# Patient Record
Sex: FEMALE | Race: WHITE | Employment: OTHER | URBAN - METROPOLITAN AREA
[De-identification: names, ages, dates, MRNs, and addresses within clinical notes are randomized per-mention and may not be internally consistent; named-entity substitution may affect disease eponyms.]

---

## 2017-04-12 ENCOUNTER — OFFICE VISIT (OUTPATIENT)
Dept: FAMILY MEDICINE CLINIC | Age: 67
End: 2017-04-12

## 2017-04-12 VITALS
TEMPERATURE: 98.4 F | BODY MASS INDEX: 22.79 KG/M2 | RESPIRATION RATE: 15 BRPM | HEIGHT: 66 IN | SYSTOLIC BLOOD PRESSURE: 127 MMHG | DIASTOLIC BLOOD PRESSURE: 72 MMHG | HEART RATE: 71 BPM | OXYGEN SATURATION: 98 % | WEIGHT: 141.8 LBS

## 2017-04-12 DIAGNOSIS — E78.5 HYPERLIPIDEMIA, UNSPECIFIED HYPERLIPIDEMIA TYPE: Primary | ICD-10-CM

## 2017-04-12 DIAGNOSIS — Z23 ENCOUNTER FOR IMMUNIZATION: ICD-10-CM

## 2017-04-12 DIAGNOSIS — R53.81 MALAISE AND FATIGUE: ICD-10-CM

## 2017-04-12 DIAGNOSIS — Z71.89 ACP (ADVANCE CARE PLANNING): ICD-10-CM

## 2017-04-12 DIAGNOSIS — Z78.0 POSTMENOPAUSAL: ICD-10-CM

## 2017-04-12 DIAGNOSIS — Z00.00 ENCOUNTER FOR MEDICARE ANNUAL WELLNESS EXAM: ICD-10-CM

## 2017-04-12 DIAGNOSIS — R53.83 MALAISE AND FATIGUE: ICD-10-CM

## 2017-04-12 DIAGNOSIS — Z11.59 SCREENING FOR VIRAL DISEASE: ICD-10-CM

## 2017-04-12 DIAGNOSIS — Z13.820 OSTEOPOROSIS SCREENING: ICD-10-CM

## 2017-04-12 NOTE — PROGRESS NOTES
HISTORY OF PRESENT ILLNESS  Stacey Kaplan is a 78 y.o. female. Blood pressure 127/72, pulse 71, temperature 98.4 °F (36.9 °C), temperature source Oral, resp. rate 15, height 5' 6\" (1.676 m), weight 141 lb 12.8 oz (64.3 kg), SpO2 98 %. Body mass index is 22.89 kg/(m^2). Chief Complaint   Patient presents with    Complete Physical      HPI   Stacey Kaplan 78 y.o. female  presents to the office today for a complete physical. Bp at office today 127/72. Hyperlipidemia: NMR Lipoprofile on 09/16/15 notable for total cholesterol 317, LDL-P 1866, LDL-C 218, HDL 85, and triglycerides 72. Pt is not on any statins. LDL is elevated at 218 per labs on 09/16/15. Will reassess levels when pt returns for fasting labs this week. Allergic rhinitis/deviated septum: Pt notes history of deviated septum and allergic rhinitis year round. Deviated septum occurred after injury to her nose many years ago and she did complete corrective surgery She is taking Claritin daily with only mild relief in symptoms. Pt does admit to using nasal sprays in the past, but states she has not regularly taken them. I have advised pt to take Flonase regularly during the duration of her allergy season. Discussed with pt she should stop using the spray if she notes any associated nasal bleeding. Health maintenance: Pt is due for Prevnar-13 vaccine. Prescription sent to pharmacy today. Pt notes last eye exam was one year ago. She is due for DEXA scan, but notes she completes it through her gynecologist (Dr. Alex Avalos). Advised pt to complete DEXA scan this year since she is long due to rule out osteopenia/osteoporosis. Orders placed today. I have asked pt a series of questions related to [employer]. Patient given [facility] Directive form and booklet. Patient advised to follow up with me or contact nurse navigator to submit these form to medical chart.  I advised Patient of the benefits of Advance Care Plan with regard to end of life care and benefits of filling forms out in case Patient cannot speak for themselves. Current Outpatient Prescriptions   Medication Sig Dispense Refill    ASCORBATE CALCIUM (VITAMIN C PO) Take 1 Tab by mouth daily.  CALCIUM PO Take 600 mg by mouth daily.  loratadine (CLARITIN) 10 mg tablet Take 10 mg by mouth daily as needed.  mometasone (NASONEX) 50 mcg/actuation nasal spray 2 Sprays by Both Nostrils route daily. 1 Container 5    azelastine (ASTELIN) 137 mcg nasal spray 2 Sprays by Both Nostrils route two (2) times a day. Use in each nostril as directed 1 Bottle 5     No Known Allergies  Past Medical History:   Diagnosis Date    Hyperlipidemia 7/9/2012     Past Surgical History:   Procedure Laterality Date    ENDOSCOPY, COLON, DIAGNOSTIC  2007    polyp Follow up 9/2012; Dr. Juan Bonilla    HX GYN      HX HEENT  9/10    cholesteatoma removal and ruptured ear drum     Family History   Problem Relation Age of Onset    Cancer Maternal Aunt      breast     Social History   Substance Use Topics    Smoking status: Former Smoker     Quit date: 1/1/1990    Smokeless tobacco: Never Used    Alcohol use Yes      Comment: ocassionally/ once per week        Review of Systems   Constitutional: Positive for malaise/fatigue. Negative for chills, diaphoresis, fever and weight loss. HENT: Positive for congestion. Negative for ear discharge, ear pain, hearing loss, nosebleeds, sore throat and tinnitus. Eyes: Negative for blurred vision, double vision, photophobia, pain, discharge and redness. Respiratory: Negative for cough, hemoptysis, sputum production, shortness of breath, wheezing and stridor. Cardiovascular: Negative for chest pain, palpitations, orthopnea, claudication, leg swelling and PND. Gastrointestinal: Negative for abdominal pain, blood in stool, constipation, diarrhea, heartburn, melena, nausea and vomiting.    Genitourinary: Negative for dysuria, flank pain, frequency, hematuria and urgency. Musculoskeletal: Negative. Negative for back pain, falls, joint pain, myalgias and neck pain. Skin: Negative for itching and rash. Neurological: Negative. Negative for dizziness, tingling, tremors, sensory change, speech change, focal weakness, seizures, loss of consciousness, weakness and headaches. Endo/Heme/Allergies: Positive for environmental allergies. Negative for polydipsia. Does not bruise/bleed easily. Psychiatric/Behavioral: Negative for depression, hallucinations, memory loss, substance abuse and suicidal ideas. The patient is not nervous/anxious and does not have insomnia. All other systems reviewed and are negative. Physical Exam   Constitutional: She is oriented to person, place, and time. She appears well-developed and well-nourished. No distress. HENT:   Head: Normocephalic and atraumatic. Right Ear: External ear normal.   Left Ear: External ear normal.   Nose: Nose normal.   Mouth/Throat: Oropharynx is clear and moist. No oropharyngeal exudate. Hypertrophic, boggy enlarged turbinates    Eyes: Conjunctivae and EOM are normal. Pupils are equal, round, and reactive to light. Right eye exhibits no discharge. Left eye exhibits no discharge. No scleral icterus. Neck: Normal range of motion. Neck supple. No JVD present. Carotid bruit is not present. No tracheal deviation present. No thyromegaly present. Cardiovascular: Normal rate, regular rhythm, normal heart sounds and intact distal pulses. Exam reveals no gallop and no friction rub. No murmur heard. Pulmonary/Chest: Effort normal and breath sounds normal. No stridor. No respiratory distress. She has no wheezes. She has no rales. She exhibits no tenderness. Abdominal: Soft. Bowel sounds are normal. She exhibits no distension and no mass. There is no tenderness. There is no rebound and no guarding. Musculoskeletal: Normal range of motion. She exhibits no edema or tenderness.    Lymphadenopathy: She has no cervical adenopathy. Neurological: She is alert and oriented to person, place, and time. She has normal reflexes. No cranial nerve deficit. She exhibits normal muscle tone. Coordination normal.   Skin: Skin is warm and dry. No rash noted. She is not diaphoretic. No erythema. No pallor. Psychiatric: She has a normal mood and affect. Her behavior is normal. Judgment and thought content normal.   Nursing note and vitals reviewed. ASSESSMENT and Tamanna Gear was seen today for complete physical.    Diagnoses and all orders for this visit:    Hyperlipidemia, unspecified hyperlipidemia type  -     METABOLIC PANEL, COMPREHENSIVE  -     LIPID PANEL  - LDL elevated at 218 per labs on 09/16/15. Pt is not on any statins. Will reassess levels when pt returns for fasting labs. Encounter for Medicare annual wellness exam    ACP (advance care planning)  Patient given [facility] Directive form and booklet. Patient advised to follow up with me or contact nurse navigator to submit these form to medical chart. I advised Patient of the benefits of Advance Care Plan with regard to end of life care and benefits of filling forms out in case Patient cannot speak for themselves. Malaise and fatigue  -     TSH 3RD GENERATION  -     THYROID ANTIBODY PANEL  -     T4, FREE  -     CBC WITH AUTOMATED DIFF  - Will check thyroid function to rule out any metabolic deficiency as cause for fatigue    Encounter for immunization  -     pneumococcal 13 janel conj dip (PREVNAR-13) 0.5 mL syrg injection; 0.5 mL by IntraMUSCular route once for 1 dose. Screening for viral disease  -     HEPATITIS C AB    Osteoporosis screening  -     DEXA BONE DENSITY STUDY AXIAL; Future    Postmenopausal  -     DEXA BONE DENSITY STUDY AXIAL;  Future      Follow-up Disposition:  Return in about 1 year (around 4/12/2018) for medicare wellness exam.     Medication risks/benefits/costs/interactions/alternatives discussed with patient. Advised patient to call back or return to office if symptoms worsen/change/persist.  If patient cannot reach us or should anything more severe/urgent arise he/she should proceed directly to the nearest emergency department. Discussed expected course/resolution/complications of diagnosis in detail with patient. Patient given a written after visit summary which includes her diagnoses, current medications and vitals. Patient expressed understanding with the diagnosis and plan. Written by dirk Ceron, as dictated by Alma Delia Ovalles M.D.    I have reviewed and agree with the above note and have made corrections where appropriate, Dr. Dannielle Nelson MD

## 2017-04-12 NOTE — MR AVS SNAPSHOT
Visit Information Date & Time Provider Department Dept. Phone Encounter #  
 4/12/2017  3:15 PM Alma Delia Ovalles MD [address] [place] [address] [phone] [id number] Follow-up Instructions Return in about 1 year (around 4/12/2018) for medicare wellness exam.  
  
Your Appointments 4/18/2018 11:00 AM  
Medicare Physical with Alma Delia Ovalles MD  
[facility]) Appt Note: medicare wellness [address] [phone]  
[phone]  
  
   
 [address] [phone] Upcoming Health Maintenance Date Due Hepatitis C Screening 1950 ZOSTER VACCINE AGE 60> 11/4/2009 GLAUCOMA SCREENING Q2Y 11/4/2014 OSTEOPOROSIS SCREENING (DEXA) 11/4/2014 Pneumococcal 65+ Low/Medium Risk (2 of 2 - PCV13) 9/16/2016 MEDICARE YEARLY EXAM 9/16/2016 BREAST CANCER SCRN MAMMOGRAM 9/23/2017 DTaP/Tdap/Td series (2 - Td) 2/18/2021 COLONOSCOPY 10/29/2025 Allergies as of 4/12/2017  Review Complete On: 4/12/2017 By: Alma Delia Ovalles MD  
 No Known Allergies Current Immunizations  Never Reviewed Name Date Pneumococcal Polysaccharide (PPSV-23) 9/16/2015 TDAP Vaccine 2/18/2011 Not reviewed this visit You Were Diagnosed With   
  
 Codes Comments Hyperlipidemia, unspecified hyperlipidemia type    -  Primary ICD-10-CM: E78.5 ICD-9-CM: 272.4 Encounter for Medicare annual wellness exam     ICD-10-CM: Z00.00 ICD-9-CM: V70.0 ACP (advance care planning)     ICD-10-CM: Z71.89 ICD-9-CM: V65.49 Malaise and fatigue     ICD-10-CM: R53.81, R53.83 ICD-9-CM: 780.79 Encounter for immunization     ICD-10-CM: I04 ICD-9-CM: V03.89 Screening for viral disease     ICD-10-CM: Z11.59 
ICD-9-CM: V73.99 Osteoporosis screening     ICD-10-CM: Z13.820 ICD-9-CM: V82.81 Postmenopausal     ICD-10-CM: Z78.0 ICD-9-CM: V49.81 Vitals BP Pulse Temp Resp Height(growth percentile) Weight(growth percentile) 127/72 (BP 1 Location: Left arm, BP Patient Position: Sitting) 71 98.4 °F (36.9 °C) (Oral) 15 5' 6\" (1.676 m) 141 lb 12.8 oz (64.3 kg) SpO2 BMI OB Status Smoking Status 98% 22.89 kg/m2 Postmenopausal Former Smoker Vitals History BMI and BSA Data Body Mass Index Body Surface Area  
 22.89 kg/m 2 1.73 m 2 Preferred Pharmacy Pharmacy Name Phone [id number]- [place], [address] [place] [address] [phone] Your Updated Medication List  
  
   
This list is accurate as of: 17  4:33 PM.  Always use your most recent med list.  
  
  
  
  
 azelastine 137 mcg (0.1 %) nasal spray Commonly known as:  ASTELIN  
2 Sprays by Both Nostrils route two (2) times a day. Use in each nostril as directed CALCIUM PO Take 600 mg by mouth daily. CLARITIN 10 mg tablet Generic drug:  loratadine Take 10 mg by mouth daily as needed. mometasone 50 mcg/actuation nasal spray Commonly known as:  NASONEX  
2 Sprays by Both Nostrils route daily. pneumococcal 13 janel conj dip 0.5 mL Syrg injection Commonly known as:  PREVNAR-13  
0.5 mL by IntraMUSCular route once for 1 dose. VITAMIN C PO Take 1 Tab by mouth daily. Prescriptions Sent to Pharmacy Refills  
 pneumococcal 13 janel conj dip (PREVNAR-13) 0.5 mL syrg injection 0 Si.5 mL by IntraMUSCular route once for 1 dose. Class: Normal  
 Pharmacy: [id number] [place], [address] [place] [address] Ph #: [phone] Route: IntraMUSCular We Performed the Following CBC WITH AUTOMATED DIFF [99975 CPT(R)] HEPATITIS C AB [60379 CPT(R)] LIPID PANEL [30501 CPT(R)] METABOLIC PANEL, COMPREHENSIVE [77613 CPT(R)] T4, FREE [id number] CPT(R)] THYROID ANTIBODY PANEL [79416 CPT(R)] TSH 3RD GENERATION [07691 CPT(R)] Follow-up Instructions Return in about 1 year (around 4/12/2018) for medicare wellness exam. To-Do List   
 04/12/2017 Imaging:  DEXA BONE DENSITY STUDY AXIAL Introducing \[facility] [place] [facility]\"" & HEALTH SERVICES! Dear Tim Cabrera: Thank you for requesting a [employer] account. Our records indicate that you already have an active [employer] account. You can access your account anytime at [email or web address] Did you know that you can access your hospital and ER discharge instructions at any time in [employer]? You can also review all of your test results from your hospital stay or ER visit. Additional Information If you have questions, please visit the Frequently Asked Questions section of the [employer] website at [email or web address] Remember, [employer] is NOT to be used for urgent needs. For medical emergencies, dial 911. Now available from your iPhone and Android! Please provide this summary of care documentation to your next provider. Your primary care clinician is listed as Lon Hurtado. If you have any questions after today's visit, please call [phone].

## 2017-04-12 NOTE — PROGRESS NOTES
Chief Complaint   Patient presents with    Complete Physical       1. Have you been to the ER, urgent care clinic since your last visit? Hospitalized since your last visit? No    2. Have you seen or consulted any other health care providers outside of the [address] [place] [address] since your last visit? Include any pap smears or colon screening. No    Body mass index is 22.89 kg/(m^2).

## 2017-04-13 ENCOUNTER — HOSPITAL ENCOUNTER (OUTPATIENT)
Dept: LAB | Age: 67
Discharge: HOME | End: 2017-04-13
Payer: MEDICARE

## 2017-04-13 PROCEDURE — 80053 COMPREHEN METABOLIC PANEL: CPT

## 2017-04-13 PROCEDURE — 86800 THYROGLOBULIN ANTIBODY: CPT

## 2017-04-13 PROCEDURE — 84439 ASSAY OF FREE THYROXINE: CPT

## 2017-04-13 PROCEDURE — 85025 COMPLETE CBC W/AUTO DIFF WBC: CPT

## 2017-04-13 PROCEDURE — 84443 ASSAY THYROID STIM HORMONE: CPT

## 2017-04-13 PROCEDURE — 80061 LIPID PANEL: CPT

## 2017-04-13 PROCEDURE — 86803 HEPATITIS C AB TEST: CPT

## 2017-04-14 LAB
ALBUMIN SERPL-MCNC: 4.3 G/DL (ref 3.6–4.8)
ALBUMIN/GLOB SERPL: 2 {RATIO} (ref 1.2–2.2)
ALP SERPL-CCNC: 73 IU/L (ref 39–117)
ALT SERPL-CCNC: 11 IU/L (ref 0–32)
AST SERPL-CCNC: 15 IU/L (ref 0–40)
BASOPHILS # BLD AUTO: 0 X10E3/UL (ref 0–0.2)
BASOPHILS NFR BLD AUTO: 0 %
BILIRUB SERPL-MCNC: 0.5 MG/DL (ref 0–1.2)
BUN SERPL-MCNC: 16 MG/DL (ref 8–27)
BUN/CREAT SERPL: 19 (ref 12–28)
CALCIUM SERPL-MCNC: 8.9 MG/DL (ref 8.7–10.3)
CHLORIDE SERPL-SCNC: 101 MMOL/L (ref 96–106)
CHOLEST SERPL-MCNC: 292 MG/DL (ref 100–199)
CO2 SERPL-SCNC: 24 MMOL/L (ref 18–29)
CREAT SERPL-MCNC: 0.83 MG/DL (ref 0.57–1)
EOSINOPHIL # BLD AUTO: 0 X10E3/UL (ref 0–0.4)
EOSINOPHIL NFR BLD AUTO: 1 %
ERYTHROCYTE [DISTWIDTH] IN BLOOD BY AUTOMATED COUNT: 12.7 % (ref 12.3–15.4)
GLOBULIN SER CALC-MCNC: 2.2 G/DL (ref 1.5–4.5)
GLUCOSE SERPL-MCNC: 90 MG/DL (ref 65–99)
HCT VFR BLD AUTO: 42.4 % (ref 34–46.6)
HCV AB S/CO SERPL IA: <0.1 S/CO RATIO (ref 0–0.9)
HDLC SERPL-MCNC: 88 MG/DL
HGB BLD-MCNC: 14 G/DL (ref 11.1–15.9)
IMM GRANULOCYTES # BLD: 0 X10E3/UL (ref 0–0.1)
IMM GRANULOCYTES NFR BLD: 0 %
INTERPRETATION, 910389: NORMAL
LDLC SERPL CALC-MCNC: 187 MG/DL (ref 0–99)
LYMPHOCYTES # BLD AUTO: 1.3 X10E3/UL (ref 0.7–3.1)
LYMPHOCYTES NFR BLD AUTO: 28 %
MCH RBC QN AUTO: 30.6 PG (ref 26.6–33)
MCHC RBC AUTO-ENTMCNC: 33 G/DL (ref 31.5–35.7)
MCV RBC AUTO: 93 FL (ref 79–97)
MONOCYTES # BLD AUTO: 0.4 X10E3/UL (ref 0.1–0.9)
MONOCYTES NFR BLD AUTO: 9 %
NEUTROPHILS # BLD AUTO: 2.8 X10E3/UL (ref 1.4–7)
NEUTROPHILS NFR BLD AUTO: 62 %
PLATELET # BLD AUTO: 233 X10E3/UL (ref 150–379)
POTASSIUM SERPL-SCNC: 4.5 MMOL/L (ref 3.5–5.2)
PROT SERPL-MCNC: 6.5 G/DL (ref 6–8.5)
RBC # BLD AUTO: 4.58 X10E6/UL (ref 3.77–5.28)
SODIUM SERPL-SCNC: 140 MMOL/L (ref 134–144)
T4 FREE SERPL-MCNC: 1.05 NG/DL (ref 0.82–1.77)
THYROGLOB AB SERPL-ACNC: <1 IU/ML (ref 0–0.9)
THYROPEROXIDASE AB SERPL-ACNC: 18 IU/ML (ref 0–34)
TRIGL SERPL-MCNC: 84 MG/DL (ref 0–149)
TSH SERPL DL<=0.005 MIU/L-ACNC: 2.21 UIU/ML (ref 0.45–4.5)
VLDLC SERPL CALC-MCNC: 17 MG/DL (ref 5–40)
WBC # BLD AUTO: 4.5 X10E3/UL (ref 3.4–10.8)

## 2017-04-14 NOTE — PROGRESS NOTES
All her her labs are stable  Except for cholesterol  LDL is 187 which is very much elevated.     We need to discuss dietary modification and possible medication management    Set up follow up OV

## 2017-04-16 NOTE — ACP (ADVANCE CARE PLANNING)
Advance Care Planning (ACP) Provider Note - Comprehensive     Date of ACP Conversation: 04/16/17  Persons included in Conversation:  patient  Length of ACP Conversation in minutes:  <16 minutes (Non-Billable)              General ACP for ALL Patients with Decision Making Capacity:   Importance of advance care planning, including choosing a healthcare agent to communicate patient's healthcare decisions if patient lost the ability to make decisions, such as after a sudden illness or accident      For Serious or Chronic Illness:  No known illness    Interventions Provided:  Recommended completion of Advance Directive form after review of ACP materials and conversation with prospective healthcare agent

## 2017-04-16 NOTE — PROGRESS NOTES
Beverly Evangelista is a 78 y.o. female and presents for annual Medicare Wellness Visit. Problem List: Reviewed with patient and discussed risk factors. Patient Active Problem List   Diagnosis Code    Hyperlipidemia E78.5    ACP (advance care planning) Z71.89       Current medical providers:  Patient Care Team:  Myla Espinoza MD as PCP - General (Family Practice)    [address] [place] [address] Ishmael: Reviewed with patient  Past Surgical History:   Procedure Laterality Date    ENDOSCOPY, COLON, DIAGNOSTIC  2007    polyp Follow up 9/2012; Dr. Fernando Rivera HX HEENT  9/10    cholesteatoma removal and ruptured ear drum        SH: Reviewed with patient  Social History   Substance Use Topics    Smoking status: Former Smoker     Quit date: 1/1/1990    Smokeless tobacco: Never Used    Alcohol use Yes      Comment: ocassionally/ once per week       FH: Reviewed with patient  Family History   Problem Relation Age of Onset    Cancer Maternal Aunt      breast       Medications/Allergies: Reviewed with patient  Current Outpatient Prescriptions on File Prior to Visit   Medication Sig Dispense Refill    ASCORBATE CALCIUM (VITAMIN C PO) Take 1 Tab by mouth daily.  CALCIUM PO Take 600 mg by mouth daily.  loratadine (CLARITIN) 10 mg tablet Take 10 mg by mouth daily as needed. No current facility-administered medications on file prior to visit. No Known Allergies    Objective:  Visit Vitals    /72 (BP 1 Location: Left arm, BP Patient Position: Sitting)    Pulse 71    Temp 98.4 °F (36.9 °C) (Oral)    Resp 15    Ht 5' 6\" (1.676 m)    Wt 141 lb 12.8 oz (64.3 kg)    SpO2 98%    BMI 22.89 kg/m2    Body mass index is 22.89 kg/(m^2).     Assessment of cognitive impairment: Alert and oriented x 3    Depression Screen:   PHQ 2 / 9, over the last two weeks 9/16/2015   Little interest or pleasure in doing things Not at all   Feeling down, depressed or hopeless Not at all   Total Score PHQ 2 0       Fall Risk Assessment: Fall Risk Assessment, last 12 mths 9/16/2015   Able to walk? Yes   Fall in past 12 months? No       Functional Ability:   Does the patient exhibit a steady gait? yes   How long did it take the patient to get up and walk from a sitting position? 1 second   Is the patient self reliant?  (ie can do own laundry, meals, household chores)  yes     Does the patient handle his/her own medications? yes     Does the patient handle his/her own money? yes     Is the patients home safe (ie good lighting, handrails on stairs and bath, etc.)? yes     Did you notice or did patient express any hearing difficulties? no     Did you notice or did patient express any vision difficulties?   no     Were distance and reading eye charts used? no       Advance Care Planning:   Patient was offered the opportunity to discuss advance care planning:  yes     Does patient have an Advance Directive:  no   If no, did you provide information on Caring Connections? yes       Plan:      Orders Placed This Encounter    DEXA BONE DENSITY STUDY AXIAL    TSH 3RD GENERATION    THYROID ANTIBODY PANEL    T4, FREE    CBC WITH AUTOMATED DIFF    METABOLIC PANEL, COMPREHENSIVE    LIPID PANEL    HEPATITIS C AB    CVD REPORT    pneumococcal 13 janel conj dip (PREVNAR-13) 0.5 mL syrg injection       Health Maintenance   Topic Date Due    ZOSTER VACCINE AGE 60>  11/04/2009    GLAUCOMA SCREENING Q2Y  11/04/2014    OSTEOPOROSIS SCREENING (DEXA)  11/04/2014    Pneumococcal 65+ Low/Medium Risk (2 of 2 - PCV13) 09/16/2016    BREAST CANCER SCRN MAMMOGRAM  09/23/2017    MEDICARE YEARLY EXAM  04/13/2018    DTaP/Tdap/Td series (2 - Td) 02/18/2021    COLONOSCOPY  10/29/2025    Hepatitis C Screening  Completed    INFLUENZA AGE 9 TO ADULT  Addressed       *Patient verbalized understanding and agreement with the plan. A copy of the After Visit Summary with personalized health plan was given to the patient today.

## 2017-04-17 ENCOUNTER — TELEPHONE (OUTPATIENT)
Dept: FAMILY MEDICINE CLINIC | Age: 67
End: 2017-04-17

## 2017-04-17 NOTE — TELEPHONE ENCOUNTER
----- Message from Glory Taylor MD sent at 4/14/2017 11:20 AM EDT -----  All her her labs are stable  Except for cholesterol  LDL is 187 which is very much elevated.     We need to discuss dietary modification and possible medication management    Set up follow up OV

## 2017-04-17 NOTE — PROGRESS NOTES
[phone] verified  Patient notified of above note and voiced understanding of what was read.     Per patient refused medication management and her diet is better than Dr Custer Koyanagi just want to do her diet and exercise

## 2017-04-17 NOTE — PROGRESS NOTES
[phone] (home) attempted to call patient no answer left message to call me back in regards to her labs

## 2017-04-17 NOTE — TELEPHONE ENCOUNTER
[phone] verified  Patient notified of above note and voiced understanding of what was read.     Per patient refused medication management and her diet is better than Dr Kylah Ng just want to do her diet and exercise

## 2017-10-27 ENCOUNTER — TELEPHONE (OUTPATIENT)
Dept: FAMILY MEDICINE CLINIC | Age: 67
End: 2017-10-27

## 2017-10-27 NOTE — TELEPHONE ENCOUNTER
Dr Gayle Ladd sent prevnar 13 4/12/2017     [phone] [facility] called in prescription to pharmacy via  for patients Prevnar 13    [phone] notified patient prescription was called in to pharmacy

## 2017-10-27 NOTE — TELEPHONE ENCOUNTER
Patient is requesting that her second pneumonia vaccine be sent to the pharmacy on file. She is requesting that the nurse return her call when the vaccine is called in.     Best call back # for patient:[phone]  Pharmacy on file verified

## 2018-04-25 ENCOUNTER — OFFICE VISIT (OUTPATIENT)
Dept: FAMILY MEDICINE CLINIC | Age: 68
End: 2018-04-25

## 2018-04-25 VITALS
HEART RATE: 70 BPM | BODY MASS INDEX: 23.3 KG/M2 | SYSTOLIC BLOOD PRESSURE: 132 MMHG | HEIGHT: 66 IN | TEMPERATURE: 98 F | DIASTOLIC BLOOD PRESSURE: 80 MMHG | RESPIRATION RATE: 17 BRPM | WEIGHT: 145 LBS | OXYGEN SATURATION: 97 %

## 2018-04-25 DIAGNOSIS — J40 BRONCHITIS: Primary | ICD-10-CM

## 2018-04-25 RX ORDER — AZITHROMYCIN 250 MG/1
TABLET, FILM COATED ORAL
Qty: 6 TAB | Refills: 0 | Status: SHIPPED | OUTPATIENT
Start: 2018-04-25 | End: 2018-04-30

## 2018-04-25 NOTE — MR AVS SNAPSHOT
Hood Mccarthy 
 
 
 [address] [facility] 
[phone] Patient: Bry Wilkinson MRN: [id number] Visit Information Date & Time Provider Department Dept. Phone Encounter #  
 4/25/2018  9:45 AM Darryle Pulley, NP [facility] [phone] [id number] Upcoming Health Maintenance Date Due ZOSTER VACCINE AGE 60> 9/4/2009 GLAUCOMA SCREENING Q2Y 11/4/2014 Bone Densitometry (Dexa) Screening 11/4/2014 Pneumococcal 65+ Low/Medium Risk (2 of 2 - PCV13) 9/16/2016 BREAST CANCER SCRN MAMMOGRAM 9/23/2017 MEDICARE YEARLY EXAM 4/13/2018 Influenza Age 5 to Adult 9/28/2018* DTaP/Tdap/Td series (2 - Td) 2/18/2021 COLONOSCOPY 10/29/2025 *Topic was postponed. The date shown is not the original due date. Allergies as of 4/25/2018  Review Complete On: 4/25/2018 By: Marvin Mccarty LPN No Known Allergies Current Immunizations  Never Reviewed Name Date Pneumococcal Polysaccharide (PPSV-23) 9/16/2015 TDAP Vaccine 2/18/2011 Not reviewed this visit You Were Diagnosed With   
  
 Codes Comments Bronchitis    -  Primary ICD-10-CM: M25 ICD-9-CM: 851 Vitals BP Pulse Temp Resp Height(growth percentile) Weight(growth percentile) 132/80 (BP 1 Location: Right arm, BP Patient Position: Sitting) 70 98 °F (36.7 °C) (Oral) 17 5' 6\" (1.676 m) 145 lb (65.8 kg) SpO2 BMI OB Status Smoking Status 97% 23.4 kg/m2 Postmenopausal Former Smoker Vitals History BMI and BSA Data Body Mass Index Body Surface Area  
 23.4 kg/m 2 1.75 m 2 Preferred Pharmacy Pharmacy Name Phone [id number] [address] [phone] Your Updated Medication List  
  
   
This list is accurate as of 4/25/18 10:20 AM.  Always use your most recent med list.  
  
  
  
  
 azithromycin 250 mg tablet Commonly known as:  Lindsey Barba Take 2 tablets today, then take 1 tablet daily CALCIUM PO Take 600 mg by mouth daily. CLARITIN 10 mg tablet Generic drug:  loratadine Take 10 mg by mouth daily as needed. VITAMIN C PO Take 1 Tab by mouth daily. Prescriptions Printed Refills  
 azithromycin (ZITHROMAX) 250 mg tablet 0 Sig: Take 2 tablets today, then take 1 tablet daily Class: Print To-Do List   
 04/25/2018 Imaging:  XR CHEST PA LAT Patient Instructions Bronchitis: Care Instructions Your Care Instructions Bronchitis is inflammation of the bronchial tubes, which carry air to the lungs. The tubes swell and produce mucus, or phlegm. The mucus and inflamed bronchial tubes make you cough. You may have trouble breathing. Most cases of bronchitis are caused by viruses like those that cause colds. Antibiotics usually do not help and they may be harmful. Bronchitis usually develops rapidly and lasts about 2 to 3 weeks in otherwise healthy people. Follow-up care is a key part of your treatment and safety. Be sure to make and go to all appointments, and call your doctor if you are having problems. It's also a good idea to know your test results and keep a list of the medicines you take. How can you care for yourself at home? · Take all medicines exactly as prescribed. Call your doctor if you think you are having a problem with your medicine. · Get some extra rest. 
· Take an over-the-counter pain medicine, such as acetaminophen (Tylenol), ibuprofen (Advil, Motrin), or naproxen (Aleve) to reduce fever and relieve body aches. Read and follow all instructions on the label. · Do not take two or more pain medicines at the same time unless the doctor told you to. Many pain medicines have acetaminophen, which is Tylenol. Too much acetaminophen (Tylenol) can be harmful.  
· Take an over-the-counter cough medicine that contains dextromethorphan to help quiet a dry, hacking cough so that you can sleep. Avoid cough medicines that have more than one active ingredient. Read and follow all instructions on the label. · Breathe moist air from a humidifier, hot shower, or sink filled with hot water. The heat and moisture will thin mucus so you can cough it out. · Do not smoke. Smoking can make bronchitis worse. If you need help quitting, talk to your doctor about stop-smoking programs and medicines. These can increase your chances of quitting for good. When should you call for help? Call 911 anytime you think you may need emergency care. For example, call if: 
? · You have severe trouble breathing. ?Call your doctor now or seek immediate medical care if: 
? · You have new or worse trouble breathing. ? · You cough up dark brown or bloody mucus (sputum). ? · You have a new or higher fever. ? · You have a new rash. ? Watch closely for changes in your health, and be sure to contact your doctor if: 
? · You cough more deeply or more often, especially if you notice more mucus or a change in the color of your mucus. ? · You are not getting better as expected. Where can you learn more? Go to http://maninder-miko.info/. Enter H333 in the search box to learn more about \"Bronchitis: Care Instructions. \" Current as of: May 12, 2017 Content Version: 11.4 © 7294-6459 Aledade. Care instructions adapted under license by [employer] (which disclaims liability or warranty for this information). If you have questions about a medical condition or this instruction, always ask your healthcare professional. [address] any warranty or liability for your use of this information. Introducing [facility] & HEALTH SERVICES! Dear Violeta Verma: Thank you for requesting a [employer] account. Our records indicate that you already have an active [employer] account.   You can access your account anytime at [email or web address] Did you know that you can access your hospital and ER discharge instructions at any time in Woto? You can also review all of your test results from your hospital stay or ER visit. Additional Information If you have questions, please visit the Frequently Asked Questions section of the Woto website at [email or web address] Remember, Woto is NOT to be used for urgent needs. For medical emergencies, dial 911. Now available from your iPhone and Android! Please provide this summary of care documentation to your next provider. Your primary care clinician is listed as Yas Garnica. If you have any questions after today's visit, please call [phone].

## 2018-04-25 NOTE — PROGRESS NOTES
Chief Complaint   Patient presents with    Chest Pain     upper chest discomfort- x 1 month     Cough     x1 month     1. Have you been to the ER, urgent care clinic since your last visit? Hospitalized since your last visit? [facility]- [facility]    2. Have you seen or consulted any other health care providers outside of the [address] [place] [address] since your last visit? Include any pap smears or colon screening.  No  Experiencing

## 2018-04-25 NOTE — PATIENT INSTRUCTIONS
Bronchitis: Care Instructions  Your Care Instructions    Bronchitis is inflammation of the bronchial tubes, which carry air to the lungs. The tubes swell and produce mucus, or phlegm. The mucus and inflamed bronchial tubes make you cough. You may have trouble breathing. Most cases of bronchitis are caused by viruses like those that cause colds. Antibiotics usually do not help and they may be harmful. Bronchitis usually develops rapidly and lasts about 2 to 3 weeks in otherwise healthy people. Follow-up care is a key part of your treatment and safety. Be sure to make and go to all appointments, and call your doctor if you are having problems. It's also a good idea to know your test results and keep a list of the medicines you take. How can you care for yourself at home? · Take all medicines exactly as prescribed. Call your doctor if you think you are having a problem with your medicine. · Get some extra rest.  · Take an over-the-counter pain medicine, such as acetaminophen (Tylenol), ibuprofen (Advil, Motrin), or naproxen (Aleve) to reduce fever and relieve body aches. Read and follow all instructions on the label. · Do not take two or more pain medicines at the same time unless the doctor told you to. Many pain medicines have acetaminophen, which is Tylenol. Too much acetaminophen (Tylenol) can be harmful. · Take an over-the-counter cough medicine that contains dextromethorphan to help quiet a dry, hacking cough so that you can sleep. Avoid cough medicines that have more than one active ingredient. Read and follow all instructions on the label. · Breathe moist air from a humidifier, hot shower, or sink filled with hot water. The heat and moisture will thin mucus so you can cough it out. · Do not smoke. Smoking can make bronchitis worse. If you need help quitting, talk to your doctor about stop-smoking programs and medicines. These can increase your chances of quitting for good.   When should you call for help? Call 911 anytime you think you may need emergency care. For example, call if:  ? · You have severe trouble breathing. ?Call your doctor now or seek immediate medical care if:  ? · You have new or worse trouble breathing. ? · You cough up dark brown or bloody mucus (sputum). ? · You have a new or higher fever. ? · You have a new rash. ? Watch closely for changes in your health, and be sure to contact your doctor if:  ? · You cough more deeply or more often, especially if you notice more mucus or a change in the color of your mucus. ? · You are not getting better as expected. Where can you learn more? Go to http://maninder-miko.info/. Enter H333 in the search box to learn more about \"Bronchitis: Care Instructions. \"  Current as of: May 12, 2017  Content Version: 11.4  © 7218-3912 [employer]ren[employer]. Care instructions adapted under license by [employer] (which disclaims liability or warranty for this information). If you have questions about a medical condition or this instruction, always ask your healthcare professional. [address] any warranty or liability for your use of this information.

## 2018-04-25 NOTE — PROGRESS NOTES
[facility] Jaciel Robison is a 75 y.o. female who was seen in clinic today (4/25/2018). Subjective:  Cough  Patient complains of nonproductive cough. Symptoms began 1 month ago. The cough is non-productive, without wheezing, dyspnea or hemoptysis and is aggravated by nothing. Associated symptoms include:postnasal drip. Patient reports having a viral infection at onset of symptoms. Denies fever, wheezing, shortness of breath or GERD. Prior to Admission medications    Medication Sig Start Date End Date Taking? Authorizing Provider   azithromycin (ZITHROMAX) 250 mg tablet Take 2 tablets today, then take 1 tablet daily 4/25/18 4/30/18 Yes Tremayne Olson NP   ASCORBATE CALCIUM (VITAMIN C PO) Take 1 Tab by mouth daily. Yes Historical Provider   CALCIUM PO Take 600 mg by mouth daily. Yes Historical Provider   loratadine (CLARITIN) 10 mg tablet Take 10 mg by mouth daily as needed. Yes Historical Provider          No Known Allergies        ROS  See HPI    Objective:   Physical Exam   Constitutional: She is oriented to person, place, and time. She appears well-developed and well-nourished. No distress. HENT:   Right Ear: Tympanic membrane and ear canal normal.   Left Ear: Tympanic membrane and ear canal normal.   Nose: Mucosal edema present. Right sinus exhibits no maxillary sinus tenderness and no frontal sinus tenderness. Left sinus exhibits no maxillary sinus tenderness and no frontal sinus tenderness. Mouth/Throat: Oropharynx is clear and moist.   Cardiovascular: Normal rate, regular rhythm and normal heart sounds. No murmur heard. Pulmonary/Chest: Effort normal and breath sounds normal. She has no decreased breath sounds. She has no wheezes. She has no rhonchi. Lymphadenopathy:     She has no cervical adenopathy. Neurological: She is alert and oriented to person, place, and time. Psychiatric: She has a normal mood and affect.  Her behavior is normal.   Nursing note and vitals reviewed. Visit Vitals    /80 (BP 1 Location: Right arm, BP Patient Position: Sitting)    Pulse 70    Temp 98 °F (36.7 °C) (Oral)    Resp 17    Ht 5' 6\" (1.676 m)    Wt 145 lb (65.8 kg)    SpO2 97%    BMI 23.4 kg/m2       Assessment & Plan:  Diagnoses and all orders for this visit:    1. Bronchitis  CXR today. Discussed that symptoms were viral and recommended treating symptoms. Increase fluids and rest. Reviewed OTC products that patient could take. -     XR CHEST PA LAT; Future  -     Delay fill azithromycin (ZITHROMAX) 250 mg tablet; Take 2 tablets today, then take 1 tablet daily      I have discussed the diagnosis with the patient and the intended plan as seen in the above orders. The patient has received an after-visit summary along with patient information handout. I have discussed medication side effects and warnings with the patient as well. Follow-up Disposition:  Return if symptoms worsen or fail to improve.         Nadeen Keyes, NP

## 2018-06-28 ENCOUNTER — OFFICE VISIT (OUTPATIENT)
Dept: FAMILY MEDICINE CLINIC | Age: 68
End: 2018-06-28

## 2018-06-28 VITALS
HEIGHT: 66 IN | SYSTOLIC BLOOD PRESSURE: 132 MMHG | RESPIRATION RATE: 18 BRPM | HEART RATE: 64 BPM | OXYGEN SATURATION: 98 % | BODY MASS INDEX: 22.82 KG/M2 | WEIGHT: 142 LBS | TEMPERATURE: 97.7 F | DIASTOLIC BLOOD PRESSURE: 82 MMHG

## 2018-06-28 DIAGNOSIS — E55.9 VITAMIN D DEFICIENCY: ICD-10-CM

## 2018-06-28 DIAGNOSIS — J45.30 MILD PERSISTENT REACTIVE AIRWAY DISEASE WITHOUT COMPLICATION: Primary | ICD-10-CM

## 2018-06-28 DIAGNOSIS — R53.82 CHRONIC FATIGUE: ICD-10-CM

## 2018-06-28 DIAGNOSIS — Z71.89 ACP (ADVANCE CARE PLANNING): ICD-10-CM

## 2018-06-28 DIAGNOSIS — Z00.00 MEDICARE ANNUAL WELLNESS VISIT, SUBSEQUENT: ICD-10-CM

## 2018-06-28 NOTE — PROGRESS NOTES
1. Have you been to the ER, urgent care clinic since your last visit? Hospitalized since your last visit? No    2. Have you seen or consulted any other health care providers outside of the [address] [place] [address] since your last visit? Include any pap smears or colon screening.  No

## 2018-06-28 NOTE — MR AVS SNAPSHOT
[facility] 
 
 
 [address] [facility] [address] [place] [address] 
[phone] Patient: Shanice Gamboa MRN: [id number] Visit Information Date & Time Provider Department Dept. Phone Encounter #  
 6/28/2018 10:30 AM Marta Matute  [facility] [phone] [id number] Upcoming Health Maintenance Date Due ZOSTER VACCINE AGE 60> 9/4/2009 GLAUCOMA SCREENING Q2Y 11/4/2014 Bone Densitometry (Dexa) Screening 11/4/2014 Pneumococcal 65+ Low/Medium Risk (2 of 2 - PCV13) 9/16/2016 BREAST CANCER SCRN MAMMOGRAM 9/23/2017 MEDICARE YEARLY EXAM 4/13/2018 Influenza Age 5 to Adult 9/28/2018* DTaP/Tdap/Td series (2 - Td) 2/18/2021 COLONOSCOPY 10/29/2025 *Topic was postponed. The date shown is not the original due date. Allergies as of 6/28/2018  Review Complete On: 6/28/2018 By: Vin Salinas LPN No Known Allergies Current Immunizations  Never Reviewed Name Date Pneumococcal Polysaccharide (PPSV-23) 9/16/2015 TDAP Vaccine 2/18/2011 Not reviewed this visit You Were Diagnosed With   
  
 Codes Comments Mild persistent reactive airway disease without complication    -  Primary ICD-10-CM: J45.30 ICD-9-CM: 493.90 Chronic fatigue     ICD-10-CM: R53.82 
ICD-9-CM: 780.79 Vitamin D deficiency     ICD-10-CM: E55.9 ICD-9-CM: 268.9 Vitals BP Pulse Temp Resp Height(growth percentile) Weight(growth percentile) 132/82 (BP 1 Location: Left arm, BP Patient Position: Sitting) 64 97.7 °F (36.5 °C) (Oral) 18 5' 6\" (1.676 m) 142 lb (64.4 kg) SpO2 BMI OB Status Smoking Status 98% 22.92 kg/m2 Postmenopausal Former Smoker Vitals History BMI and BSA Data Body Mass Index Body Surface Area  
 22.92 kg/m 2 1.73 m 2 Preferred Pharmacy Pharmacy Name Phone [id number] [place] [address] [facility] [phone] Your Updated Medication List  
  
   
This list is accurate as of 6/28/18 10:56 AM.  Always use your most recent med list.  
  
  
  
  
 CALCIUM PO Take 600 mg by mouth daily. CLARITIN 10 mg tablet Generic drug:  loratadine Take 10 mg by mouth daily as needed. VITAMIN C PO Take 1 Tab by mouth daily. We Performed the Following CBC WITH AUTOMATED DIFF [75480 CPT(R)] METABOLIC PANEL, COMPREHENSIVE [45496 CPT(R)] REFERRAL TO PULMONARY DISEASE [CAU05 Custom] TSH AND FREE T4 [04962 CPT(R)] VITAMIN D, 25 HYDROXY [id number] CPT(R)] Referral Information Referral ID Referred By Referred To  
  
 [id number] [facility] [address], [address] [facility] Visits Status Start Date End Date 1 New Request 6/28/18 6/28/19 If your referral has a status of pending review or denied, additional information will be sent to support the outcome of this decision. Patient Instructions Asthma in Adults: Care Instructions Your Care Instructions During an asthma attack, your airways swell and narrow as a reaction to certain things (triggers). This makes it hard to breathe. You may be able to prevent asthma attacks if you avoid the things that set off your asthma symptoms. Keeping your asthma under control and treating symptoms before they get bad can help you avoid severe attacks. If you can control your asthma, you may be able to do all of your normal daily activities. You may also avoid asthma attacks and trips to the hospital. 
Follow-up care is a key part of your treatment and safety. Be sure to make and go to all appointments, and call your doctor if you are having problems. It's also a good idea to know your test results and keep a list of the medicines you take. How can you care for yourself at home? · Follow your asthma action plan so you can manage your symptoms at home. An asthma action plan will help you prevent and control airway reactions and will tell you what to do during an asthma attack. If you do not have an asthma action plan, work with your doctor to build one. · Take your asthma medicine exactly as prescribed. Medicine plays an important role in controlling asthma. Talk to your doctor right away if you have any questions about what to take and how to take it. ¨ Use your quick-relief medicine when you have symptoms of an attack. Quick-relief medicine often is an albuterol inhaler. Some people need to use quick-relief medicine before they exercise. ¨ Take your controller medicine every day, not just when you have symptoms. Controller medicine is usually an inhaled corticosteroid. The goal is to prevent problems before they occur. Do not use your controller medicine to try to treat an attack that has already started. It does not work fast enough to help. ¨ If your doctor prescribed corticosteroid pills to use during an attack, take them as directed. They may take hours to work, but they may shorten the attack and help you breathe better. ¨ Keep your quick-relief medicine with you at all times. · Talk to your doctor before using other medicines. Some medicines, such as aspirin, can cause asthma attacks in some people. · Check yourself for asthma symptoms to know which step to follow in your action plan. Watch for things like being short of breath, having chest tightness, coughing, and wheezing. Also notice if symptoms wake you up at night or if you get tired quickly when you exercise. · If you have a peak flow meter, use it to check how well you are breathing. This can help you predict when an asthma attack is going to occur. Then you can take medicine to prevent the asthma attack or make it less severe. · See your doctor regularly. These visits will help you learn more about asthma and what you can do to control it.  Your doctor will monitor your treatment to make sure the medicine is helping you. · Keep track of your asthma attacks and your treatment. After you have had an attack, write down what triggered it, what helped end it, and any concerns you have about your asthma action plan. Take your diary when you see your doctor. You can then review your asthma action plan and decide if it is working. · Do not smoke or allow others to smoke around you. Avoid smoky places. Smoking makes asthma worse. If you need help quitting, talk to your doctor about stop-smoking programs and medicines. These can increase your chances of quitting for good. · Learn what triggers an asthma attack for you, and avoid the triggers when you can. Common triggers include colds, smoke, air pollution, dust, pollen, mold, pets, cockroaches, stress, and cold air. · Avoid colds and the flu. Get a pneumococcal vaccine shot. If you have had one before, ask your doctor whether you need a second dose. Get a flu vaccine every fall. If you must be around people with colds or the flu, wash your hands often. When should you call for help? Call 911 anytime you think you may need emergency care. For example, call if: 
? · You have severe trouble breathing. ?Call your doctor now or seek immediate medical care if: 
? · Your symptoms do not get better after you have followed your asthma action plan. ? · You cough up yellow, dark brown, or bloody mucus (sputum). ? Watch closely for changes in your health, and be sure to contact your doctor if: 
? · Your coughing and wheezing get worse. ? · You need to use quick-relief medicine on more than 2 days a week (unless it is just for exercise). ? · You need help figuring out what is triggering your asthma attacks. Where can you learn more? Go to http://maninder-miko.info/. Enter P597 in the search box to learn more about \"Asthma in Adults: Care Instructions. \" Current as of: May 12, 2017 Content Version: 11.4 © 0434-0991 Healthwise[employer] Incorporated. Care instructions adapted under license by [employer]-aki networks (which disclaims liability or warranty for this information). If you have questions about a medical condition or this instruction, always ask your healthcare professional. [address] any warranty or liability for your use of this information. Introducing \[facility]\"" & HEALTH SERVICES! Dear Jem Tavares: Thank you for requesting a [employer] account. Our records indicate that you already have an active [employer] account. You can access your account anytime at [email or web address] Did you know that you can access your hospital and ER discharge instructions at any time in [employer]? You can also review all of your test results from your hospital stay or ER visit. Additional Information If you have questions, please visit the Frequently Asked Questions section of the [employer] website at [email or web address] Remember, [employer] is NOT to be used for urgent needs. For medical emergencies, dial 911. Now available from your iPhone and Android! Please provide this summary of care documentation to your next provider. Your primary care clinician is listed as Paolo Jonas. If you have any questions after today's visit, please call [phone].

## 2018-06-28 NOTE — ACP (ADVANCE CARE PLANNING)
Advance Care Planning (ACP) Provider Conversation Snapshot    Date of ACP Conversation: 06/28/18  Persons included in Conversation:  patient  Length of ACP Conversation in minutes:  <16 minutes (Non-Billable)    Authorized Decision Maker (if patient is incapable of making informed decisions):    This person is:   Healthcare Agent/Medical Power of  under Advance Directive          For Patients with Decision Making Capacity:   Values/Goals: Exploration of values, goals, and preferences if recovery is not expected, even with continued medical treatment in the event of:  Imminent death    Conversation Outcomes / Follow-Up Plan:   Recommended communicating the plan and making copies for the healthcare agent, personal physician, and others as appropriate (e.g., health system)

## 2018-06-28 NOTE — PROGRESS NOTES
Brett Rice is a 75 y.o. female and presents for annual Medicare Wellness Visit. Problem List: Reviewed with patient and discussed risk factors. Patient Active Problem List   Diagnosis Code    Hyperlipidemia E78.5    ACP (advance care planning) Z71.89       Current medical providers:  Patient Care Team:  Loi Bolton MD as PCP - General (Family Practice)    [address] [place] [address] Ishmael: Reviewed with patient  Past Surgical History:   Procedure Laterality Date    ENDOSCOPY, COLON, DIAGNOSTIC  2007    polyp Follow up 9/2012; Dr. Nando Payne  HEENT  9/10    cholesteatoma removal and ruptured ear drum        SH: Reviewed with patient  Social History   Substance Use Topics    Smoking status: Former Smoker     Quit date: 1/1/1990    Smokeless tobacco: Never Used    Alcohol use Yes      Comment: ocassionally/ once per week       FH: Reviewed with patient  Family History   Problem Relation Age of Onset    Cancer Maternal Aunt      breast       Medications/Allergies: Reviewed with patient  Current Outpatient Prescriptions on File Prior to Visit   Medication Sig Dispense Refill    ASCORBATE CALCIUM (VITAMIN C PO) Take 1 Tab by mouth daily.  CALCIUM PO Take 600 mg by mouth daily.  loratadine (CLARITIN) 10 mg tablet Take 10 mg by mouth daily as needed. No current facility-administered medications on file prior to visit. No Known Allergies    Objective:  Visit Vitals    /82 (BP 1 Location: Left arm, BP Patient Position: Sitting)    Pulse 64    Temp 97.7 °F (36.5 °C) (Oral)    Resp 18    Ht 5' 6\" (1.676 m)    Wt 142 lb (64.4 kg)    SpO2 98%    BMI 22.92 kg/m2    Body mass index is 22.92 kg/(m^2).     Assessment of cognitive impairment: Alert and oriented x 3    Depression Screen:   PHQ over the last two weeks 9/16/2015   Little interest or pleasure in doing things Not at all   Feeling down, depressed or hopeless Not at all   Total Score PHQ 2 0       Fall Risk Assessment:    Fall Risk Assessment, last 12 mths 9/16/2015   Able to walk? Yes   Fall in past 12 months? No       Functional Ability:   Does the patient exhibit a steady gait? yes   How long did it take the patient to get up and walk from a sitting position? 1 sec   Is the patient self reliant?  (ie can do own laundry, meals, household chores)  yes     Does the patient handle his/her own medications? yes     Does the patient handle his/her own money? yes     Is the patients home safe (ie good lighting, handrails on stairs and bath, etc.)? yes     Did you notice or did patient express any hearing difficulties? no     Did you notice or did patient express any vision difficulties?   no     Were distance and reading eye charts used? no       Advance Care Planning:   Patient was offered the opportunity to discuss advance care planning:  yes     Does patient have an Advance Directive:  yes   If no, did you provide information on Caring Connections?  no       Plan:      Orders Placed This Encounter    METABOLIC PANEL, COMPREHENSIVE    TSH AND FREE T4    VITAMIN D, 25 HYDROXY    CBC WITH AUTOMATED DIFF    [facility]       Health Maintenance   Topic Date Due    ZOSTER VACCINE AGE 60>  09/04/2009    GLAUCOMA SCREENING Q2Y  11/04/2014    Bone Densitometry (Dexa) Screening  11/04/2014    Pneumococcal 65+ Low/Medium Risk (2 of 2 - PCV13) 09/16/2016    BREAST CANCER SCRN MAMMOGRAM  09/23/2017    MEDICARE YEARLY EXAM  04/13/2018    Influenza Age 9 to Adult  09/28/2018 (Originally 8/1/2018)    DTaP/Tdap/Td series (2 - Td) 02/18/2021    COLONOSCOPY  10/29/2025    Hepatitis C Screening  Completed       *Patient verbalized understanding and agreement with the plan. A copy of the After Visit Summary with personalized health plan was given to the patient today.

## 2018-06-28 NOTE — PROGRESS NOTES
[facility] Note    Shari Corley is a 75 y.o. female who was seen in clinic today (6/28/2018). Subjective:  Asthma  Patient presents for evaluation of non-productive cough, fatigue, dyspnea and chest tightness. The patient has not been previously diagnosed with asthma. Observed precipitants include exercise and heat/ Patient reports symptoms have continued for last 3 months without impriovement. Has remote 15 year smoking history in her 20-30s. Patient also reports a h/o chronic congestion and previously seen by ENT. Had septoplasty without improvement of symptoms. XR Results (most recent):    Results from Appointment encounter on 04/25/18   XR CHEST PA LAT   Narrative INDICATION: cough    EXAM: CXR 2 View    FINDINGS: Frontal and lateral views of the chest show clear lungs. Heart size is  normal. There is no pulmonary edema. There is no evident pneumothorax,  adenopathy or effusion. Impression IMPRESSION: Normal two view chest x-ray. Prior to Admission medications    Medication Sig Start Date End Date Taking? Authorizing Provider   ASCORBATE CALCIUM (VITAMIN C PO) Take 1 Tab by mouth daily. Yes Historical Provider   CALCIUM PO Take 600 mg by mouth daily. Yes Historical Provider   loratadine (CLARITIN) 10 mg tablet Take 10 mg by mouth daily as needed. Yes Historical Provider          No Known Allergies        ROS  See HPI    Objective:   Physical Exam   Constitutional: She is oriented to person, place, and time. She appears well-developed and well-nourished. Neck: Normal range of motion. Neck supple. No JVD present. Carotid bruit is not present. No thyromegaly present. Cardiovascular: Normal rate, regular rhythm and intact distal pulses. Exam reveals no gallop and no friction rub. No murmur heard. Pulmonary/Chest: Effort normal and breath sounds normal. No respiratory distress. Musculoskeletal: She exhibits no edema. Lymphadenopathy:     She has no cervical adenopathy. Neurological: She is alert and oriented to person, place, and time. Psychiatric: She has a normal mood and affect. Her behavior is normal.   Nursing note and vitals reviewed. Visit Vitals    /82 (BP 1 Location: Left arm, BP Patient Position: Sitting)    Pulse 64    Temp 97.7 °F (36.5 °C) (Oral)    Resp 18    Ht 5' 6\" (1.676 m)    Wt 142 lb (64.4 kg)    SpO2 98%    BMI 22.92 kg/m2       Assessment & Plan:  Diagnoses and all orders for this visit:    1. Mild persistent reactive airway disease without complication  Inhaler deferred at present. Patient requests pulmonology evaluation.   -     [facility] [address] [place] [address]    2. Chronic fatigue  Rule out endocrine, hematologic or metabolic etiology.   -     METABOLIC PANEL, COMPREHENSIVE  -     TSH AND FREE T4  -     VITAMIN D, 25 HYDROXY  -     CBC WITH AUTOMATED DIFF    3. Vitamin D deficiency   -     VITAMIN D, 25 HYDROXY      I have discussed the diagnosis with the patient and the intended plan as seen in the above orders. The patient has received an after-visit summary along with patient information handout. I have discussed medication side effects and warnings with the patient as well. Follow-up Disposition:  Return if symptoms worsen or fail to improve.         Tracy Davila NP

## 2018-06-28 NOTE — PATIENT INSTRUCTIONS

## 2018-07-20 ENCOUNTER — OFFICE VISIT (OUTPATIENT)
Dept: FAMILY MEDICINE CLINIC | Age: 68
End: 2018-07-20

## 2018-07-20 ENCOUNTER — TELEPHONE (OUTPATIENT)
Dept: FAMILY MEDICINE CLINIC | Age: 68
End: 2018-07-20

## 2018-07-20 ENCOUNTER — HOSPITAL ENCOUNTER (OUTPATIENT)
Dept: LAB | Age: 68
Discharge: HOME | End: 2018-07-20
Payer: MEDICARE

## 2018-07-20 VITALS
HEIGHT: 66 IN | DIASTOLIC BLOOD PRESSURE: 80 MMHG | HEART RATE: 69 BPM | TEMPERATURE: 97 F | BODY MASS INDEX: 22.92 KG/M2 | SYSTOLIC BLOOD PRESSURE: 128 MMHG | OXYGEN SATURATION: 98 % | RESPIRATION RATE: 18 BRPM | WEIGHT: 142.6 LBS

## 2018-07-20 DIAGNOSIS — G51.4 FACIAL TWITCHING: ICD-10-CM

## 2018-07-20 DIAGNOSIS — R06.02 SHORTNESS OF BREATH: Primary | ICD-10-CM

## 2018-07-20 PROCEDURE — 82306 VITAMIN D 25 HYDROXY: CPT

## 2018-07-20 PROCEDURE — 85025 COMPLETE CBC W/AUTO DIFF WBC: CPT

## 2018-07-20 PROCEDURE — 84443 ASSAY THYROID STIM HORMONE: CPT

## 2018-07-20 PROCEDURE — 80053 COMPREHEN METABOLIC PANEL: CPT

## 2018-07-20 PROCEDURE — 36415 COLL VENOUS BLD VENIPUNCTURE: CPT

## 2018-07-20 NOTE — TELEPHONE ENCOUNTER
Outbound call to patient. No answer left message for patient to return call regarding appt scheduled for today. Patient is scheduled for Complete physical. Patient just had annual wellness visit in June with NP [facility].

## 2018-07-20 NOTE — MR AVS SNAPSHOT
[facility] 
 
 
 [address] [facility] [place] [address] 
[phone] Patient: Jennifer Marcelo MRN: [id number] QBE:[id number] Visit Information Date & Time Provider Department Dept. Phone Encounter #  
 7/20/2018  3:00 PM Patricia Burgos  [facility] [phone] [id number] Follow-up Instructions Return if symptoms worsen or fail to improve, for Follow Up. Upcoming Health Maintenance Date Due ZOSTER VACCINE AGE 60> 9/4/2009 GLAUCOMA SCREENING Q2Y 11/4/2014 Bone Densitometry (Dexa) Screening 11/4/2014 Pneumococcal 65+ Low/Medium Risk (2 of 2 - PCV13) 9/16/2016 BREAST CANCER SCRN MAMMOGRAM 9/23/2017 Influenza Age 5 to Adult 9/28/2018* MEDICARE YEARLY EXAM 6/29/2019 DTaP/Tdap/Td series (2 - Td) 2/18/2021 COLONOSCOPY 10/29/2025 *Topic was postponed. The date shown is not the original due date. Allergies as of 7/20/2018  Review Complete On: 7/20/2018 By: Bacilio Mckeon LPN No Known Allergies Current Immunizations  Never Reviewed Name Date Pneumococcal Polysaccharide (PPSV-23) 9/16/2015 TDAP Vaccine 2/18/2011 Not reviewed this visit You Were Diagnosed With   
  
 Codes Comments Shortness of breath    -  Primary ICD-10-CM: R06.02 
ICD-9-CM: 786.05 Facial twitching     ICD-10-CM: G51.4 ICD-9-CM: 351.8 Vitals BP Pulse Temp Resp Height(growth percentile) Weight(growth percentile) 128/80 (BP 1 Location: Left arm, BP Patient Position: Sitting) 69 97 °F (36.1 °C) (Oral) 18 5' 6\" (1.676 m) 142 lb 9.6 oz (64.7 kg) SpO2 BMI OB Status Smoking Status 98% 23.02 kg/m2 Postmenopausal Former Smoker BMI and BSA Data Body Mass Index Body Surface Area 23.02 kg/m 2 1.74 m 2 Preferred Pharmacy Pharmacy Name Phone [id number] [address] [facility] [phone] Your Updated Medication List  
  
   
 This list is accurate as of 7/20/18  3:25 PM.  Always use your most recent med list.  
  
  
  
  
 CALCIUM PO Take 600 mg by mouth daily. CLARITIN 10 mg tablet Generic drug:  loratadine Take 10 mg by mouth daily as needed. VITAMIN C PO Take 1 Tab by mouth daily. Follow-up Instructions Return if symptoms worsen or fail to improve, for Follow Up. To-Do List   
 07/20/2018 Imaging:  XR CHEST PA LAT Patient Instructions Shortness of Breath: Care Instructions Your Care Instructions Shortness of breath has many causes. Sometimes conditions such as anxiety can lead to shortness of breath. Some people get mild shortness of breath when they exercise. Trouble breathing also can be a symptom of a serious problem, such as asthma, lung disease, emphysema, heart problems, and pneumonia. If your shortness of breath continues, you may need tests and treatment. Watch for any changes in your breathing and other symptoms. Follow-up care is a key part of your treatment and safety. Be sure to make and go to all appointments, and call your doctor if you are having problems. It's also a good idea to know your test results and keep a list of the medicines you take. How can you care for yourself at home? · Do not smoke or allow others to smoke around you. If you need help quitting, talk to your doctor about stop-smoking programs and medicines. These can increase your chances of quitting for good. · Get plenty of rest and sleep. · Take your medicines exactly as prescribed. Call your doctor if you think you are having a problem with your medicine. · Find healthy ways to deal with stress. ¨ Exercise daily. ¨ Get plenty of sleep. ¨ Eat regularly and well. When should you call for help? Call 911 anytime you think you may need emergency care. For example, call if: 
  · You have severe shortness of breath.  
  · You have symptoms of a heart attack. These may include: ¨ Chest pain or pressure, or a strange feeling in the chest. 
¨ Sweating. ¨ Shortness of breath. ¨ Nausea or vomiting. ¨ Pain, pressure, or a strange feeling in the back, neck, jaw, or upper belly or in one or both shoulders or arms. ¨ Lightheadedness or sudden weakness. ¨ A fast or irregular heartbeat. After you call 911, the  may tell you to chew 1 adult-strength or 2 to 4 low-dose aspirin. Wait for an ambulance. Do not try to drive yourself.  
 Call your doctor now or seek immediate medical care if: 
  · Your shortness of breath gets worse or you start to wheeze. Wheezing is a high-pitched sound when you breathe.  
  · You wake up at night out of breath or have to prop your head up on several pillows to breathe.  
  · You are short of breath after only light activity or while at rest.  
 Watch closely for changes in your health, and be sure to contact your doctor if: 
  · You do not get better over the next 1 to 2 days. Where can you learn more? Go to http://maninder-miko.info/. Enter S780 in the search box to learn more about \"Shortness of Breath: Care Instructions. \" Current as of: December 6, 2017 Content Version: 11.7 © 8986-9781 HireVue. Care instructions adapted under license by Immy (which disclaims liability or warranty for this information). If you have questions about a medical condition or this instruction, always ask your healthcare professional. [place] [address] any warranty or liability for your use of this information. Introducing [facility] & HEALTH SERVICES! Dear Cachorro Adame: Thank you for requesting a JoopLoop account. Our records indicate that you already have an active JoopLoop account. You can access your account anytime at [email or web address]. Soulstice Endeavors/AnySource Media Did you know that you can access your hospital and ER discharge instructions at any time in JoopLoop?   You can also review all of your test results from your hospital stay or ER visit. Additional Information If you have questions, please visit the Frequently Asked Questions section of the Websupport website at [email or web address] [email or web address]. [email or web address] Remember, Websupport is NOT to be used for urgent needs. For medical emergencies, dial 911. Now available from your iPhone and Android! Please provide this summary of care documentation to your next provider. Your primary care clinician is listed as Baldomero Dunlap. If you have any questions after today's visit, please call [phone].

## 2018-07-20 NOTE — PROGRESS NOTES
Chief Complaint   Patient presents with    Shortness of Breath     follow up visit. Patient needs chest x ray for pulmonology. 1. Have you been to the ER, urgent care clinic since your last visit? Hospitalized since your last visit? No    2. Have you seen or consulted any other health care providers outside of the [address] [place] [address] since your last visit? Include any pap smears or colon screening.  No

## 2018-07-20 NOTE — PATIENT INSTRUCTIONS

## 2018-07-21 LAB
25(OH)D3+25(OH)D2 SERPL-MCNC: 28.5 NG/ML (ref 30–100)
ALBUMIN SERPL-MCNC: 4.4 G/DL (ref 3.6–4.8)
ALBUMIN/GLOB SERPL: 1.8 {RATIO} (ref 1.2–2.2)
ALP SERPL-CCNC: 77 IU/L (ref 39–117)
ALT SERPL-CCNC: 10 IU/L (ref 0–32)
AST SERPL-CCNC: 16 IU/L (ref 0–40)
BASOPHILS # BLD AUTO: 0 X10E3/UL (ref 0–0.2)
BASOPHILS NFR BLD AUTO: 1 %
BILIRUB SERPL-MCNC: 0.6 MG/DL (ref 0–1.2)
BUN SERPL-MCNC: 15 MG/DL (ref 8–27)
BUN/CREAT SERPL: 15 (ref 12–28)
CALCIUM SERPL-MCNC: 9.4 MG/DL (ref 8.7–10.3)
CHLORIDE SERPL-SCNC: 101 MMOL/L (ref 96–106)
CO2 SERPL-SCNC: 24 MMOL/L (ref 20–29)
CREAT SERPL-MCNC: 1 MG/DL (ref 0.57–1)
EOSINOPHIL # BLD AUTO: 0 X10E3/UL (ref 0–0.4)
EOSINOPHIL NFR BLD AUTO: 1 %
ERYTHROCYTE [DISTWIDTH] IN BLOOD BY AUTOMATED COUNT: 13.3 % (ref 12.3–15.4)
GLOBULIN SER CALC-MCNC: 2.5 G/DL (ref 1.5–4.5)
GLUCOSE SERPL-MCNC: 97 MG/DL (ref 65–99)
HCT VFR BLD AUTO: 42.8 % (ref 34–46.6)
HGB BLD-MCNC: 14.4 G/DL (ref 11.1–15.9)
IMM GRANULOCYTES # BLD: 0 X10E3/UL (ref 0–0.1)
IMM GRANULOCYTES NFR BLD: 0 %
INTERPRETATION: NORMAL
LYMPHOCYTES # BLD AUTO: 1.5 X10E3/UL (ref 0.7–3.1)
LYMPHOCYTES NFR BLD AUTO: 26 %
MCH RBC QN AUTO: 30.4 PG (ref 26.6–33)
MCHC RBC AUTO-ENTMCNC: 33.6 G/DL (ref 31.5–35.7)
MCV RBC AUTO: 90 FL (ref 79–97)
MONOCYTES # BLD AUTO: 0.6 X10E3/UL (ref 0.1–0.9)
MONOCYTES NFR BLD AUTO: 11 %
NEUTROPHILS # BLD AUTO: 3.5 X10E3/UL (ref 1.4–7)
NEUTROPHILS NFR BLD AUTO: 61 %
PLATELET # BLD AUTO: 249 X10E3/UL (ref 150–379)
POTASSIUM SERPL-SCNC: 4.4 MMOL/L (ref 3.5–5.2)
PROT SERPL-MCNC: 6.9 G/DL (ref 6–8.5)
RBC # BLD AUTO: 4.74 X10E6/UL (ref 3.77–5.28)
SODIUM SERPL-SCNC: 140 MMOL/L (ref 134–144)
T4 FREE SERPL-MCNC: 1.01 NG/DL (ref 0.82–1.77)
TSH SERPL DL<=0.005 MIU/L-ACNC: 3.2 UIU/ML (ref 0.45–4.5)
WBC # BLD AUTO: 5.6 X10E3/UL (ref 3.4–10.8)

## 2018-07-26 ENCOUNTER — OFFICE VISIT (OUTPATIENT)
Dept: FAMILY MEDICINE CLINIC | Age: 68
End: 2018-07-26

## 2018-07-26 VITALS
HEIGHT: 66 IN | DIASTOLIC BLOOD PRESSURE: 76 MMHG | OXYGEN SATURATION: 99 % | RESPIRATION RATE: 18 BRPM | SYSTOLIC BLOOD PRESSURE: 131 MMHG | BODY MASS INDEX: 23.14 KG/M2 | HEART RATE: 67 BPM | TEMPERATURE: 97.5 F | WEIGHT: 144 LBS

## 2018-07-26 DIAGNOSIS — Z78.0 POSTMENOPAUSAL: ICD-10-CM

## 2018-07-26 DIAGNOSIS — E78.5 HYPERLIPIDEMIA, UNSPECIFIED HYPERLIPIDEMIA TYPE: ICD-10-CM

## 2018-07-26 DIAGNOSIS — Z23 ENCOUNTER FOR IMMUNIZATION: ICD-10-CM

## 2018-07-26 DIAGNOSIS — Z12.31 VISIT FOR SCREENING MAMMOGRAM: ICD-10-CM

## 2018-07-26 DIAGNOSIS — S99.921A INJURY OF TOE ON RIGHT FOOT, INITIAL ENCOUNTER: Primary | ICD-10-CM

## 2018-07-26 NOTE — PROGRESS NOTES
[facility] Note    Tristan Boyer is a 75 y.o. female who was seen in clinic today (7/26/2018). Subjective: Foot Pain  Patient complains of right foot pain. Onset of the symptoms was 1 week ago. Inciting event: stubbed 5th toe on door jam. Current symptoms include ability to bear weight, but with some pain. Aggravating symptoms: standing, walking, pressure to area. Patient's overall course: symptoms have progressed to a point and plateaued. Evaluation to date: none. Treatment to date: buddy taped toes. Prior to Admission medications    Medication Sig Start Date End Date Taking? Authorizing Provider   varicella-zoster recombinant, PF, (SHINGRIX, PF,) 50 mcg/0.5 mL susr injection 0.5 mL by IntraMUSCular route once for 1 dose. 7/26/18 7/26/18 Yes Hyacinth Apodaca NP   ASCORBATE CALCIUM (VITAMIN C PO) Take 1 Tab by mouth daily. Yes Historical Provider   CALCIUM PO Take 600 mg by mouth daily. Yes Historical Provider   loratadine (CLARITIN) 10 mg tablet Take 10 mg by mouth daily as needed. Yes Historical Provider          No Known Allergies        ROS  See HPI    Objective:   Physical Exam   Constitutional: She is oriented to person, place, and time. She appears well-developed and well-nourished. Cardiovascular: Normal rate, regular rhythm and intact distal pulses. Exam reveals no gallop and no friction rub. No murmur heard. Pulmonary/Chest: Effort normal and breath sounds normal. No respiratory distress. Musculoskeletal:        Right foot: There is tenderness, bony tenderness (5th toe) and swelling. There is no deformity. Neurological: She is alert and oriented to person, place, and time. Psychiatric: She has a normal mood and affect. Her behavior is normal. Judgment and thought content normal.   Nursing note and vitals reviewed.         Visit Vitals    /76 (BP 1 Location: Left arm, BP Patient Position: Sitting)    Pulse 67    Temp 97.5 °F (36.4 °C) (Oral)    Resp 18    Ht 5' 6\" (1.676 m)    Wt 144 lb (65.3 kg)    SpO2 99%    BMI 23.24 kg/m2       Assessment & Plan:  Diagnoses and all orders for this visit:    1. Injury of toe on right foot, initial encounter  Evaluate for fracture. Continue to katrin tape for support. NSAIDs PRN. -     XR 5TH TOE RT MIN 2 V; Future    2. Postmenopausal  -     DEXA BONE DENSITY STUDY AXIAL; Future    3. Visit for screening mammogram  -     [facility] MAMMO BI SCREENING INCL CAD; Future    4. Encounter for immunization  -     varicella-zoster recombinant, PF, (SHINGRIX, PF,) 50 mcg/0.5 mL susr injection; 0.5 mL by IntraMUSCular route once for 1 dose. I have discussed the diagnosis with the patient and the intended plan as seen in the above orders. The patient has received an after-visit summary along with patient information handout. I have discussed medication side effects and warnings with the patient as well. Follow-up Disposition:  Return if symptoms worsen or fail to improve.         Martin Pretty NP

## 2018-07-26 NOTE — MR AVS SNAPSHOT
[facility] 
 
 
 [facility] [address] [place] [address] 
[phone] Patient: Lani Drake MRN: [id number] ULU:[id number] Visit Information Date & Time Provider Department Dept. Phone Encounter #  
 7/26/2018  3:45 PM Lora Burkitt,  [phone] [id number] Follow-up Instructions Return if symptoms worsen or fail to improve. Upcoming Health Maintenance Date Due ZOSTER VACCINE AGE 60> 9/4/2009 GLAUCOMA SCREENING Q2Y 11/4/2014 Bone Densitometry (Dexa) Screening 11/4/2014 Pneumococcal 65+ Low/Medium Risk (2 of 2 - PCV13) 9/16/2016 BREAST CANCER SCRN MAMMOGRAM 9/23/2017 Influenza Age 5 to Adult 9/28/2018* MEDICARE YEARLY EXAM 6/29/2019 DTaP/Tdap/Td series (2 - Td) 2/18/2021 COLONOSCOPY 10/29/2025 *Topic was postponed. The date shown is not the original due date. Allergies as of 7/26/2018  Review Complete On: 7/26/2018 By: Sukhdev Aguila No Known Allergies Current Immunizations  Never Reviewed Name Date Pneumococcal Polysaccharide (PPSV-23) 9/16/2015 TDAP Vaccine 2/18/2011 Not reviewed this visit You Were Diagnosed With   
  
 Codes Comments Injury of toe on right foot, initial encounter    -  Primary ICD-10-CM: I92.112R ICD-9-CM: 809. 7 Vitals BP Pulse Temp Resp Height(growth percentile) Weight(growth percentile) 131/76 (BP 1 Location: Left arm, BP Patient Position: Sitting) 67 97.5 °F (36.4 °C) (Oral) 18 5' 6\" (1.676 m) 144 lb (65.3 kg) SpO2 BMI OB Status Smoking Status 99% 23.24 kg/m2 Postmenopausal Former Smoker BMI and BSA Data Body Mass Index Body Surface Area  
 23.24 kg/m 2 1.74 m 2 Preferred Pharmacy Pharmacy Name Phone [id number] [address] [place] [address] Ave [phone] Your Updated Medication List  
  
   
 This list is accurate as of 7/26/18  4:20 PM.  Always use your most recent med list.  
  
  
  
  
 CALCIUM PO Take 600 mg by mouth daily. CLARITIN 10 mg tablet Generic drug:  loratadine Take 10 mg by mouth daily as needed. VITAMIN C PO Take 1 Tab by mouth daily. Follow-up Instructions Return if symptoms worsen or fail to improve. To-Do List   
 07/26/2018 Imaging:  XR 5TH TOE RT MIN 2 V Patient Instructions Broken Toe: Care Instructions Your Care Instructions You have broken (fractured) a bone in your toe. This kind of fracture does not need a special cast or brace. \"Buddy-taping\" your broken toe to a healthy toe next to it is almost always enough to treat the problem and ease symptoms. The toe may take 4 weeks or more to heal. 
You heal best when you take good care of yourself. Eat a variety of healthy foods, and don't smoke. Follow-up care is a key part of your treatment and safety. Be sure to make and go to all appointments, and call your doctor if you are having problems. It's also a good idea to know your test results and keep a list of the medicines you take. How can you care for yourself at home? · Be safe with medicines. Take pain medicines exactly as directed. ¨ If the doctor gave you a prescription medicine for pain, take it as prescribed. ¨ If you are not taking a prescription pain medicine, ask your doctor if you can take an over-the-counter medicine. · If your toe is taped to the toe next to it, your doctor has shown you how to change the tape. Protect the skin by putting something soft, such as felt or foam, between your toes before you tape them together. Never tape the toes together skin-to-skin. Your broken toe may need to be buddy-taped for 2 to 4 weeks to heal. 
· Rest and protect your toe. Do not walk on it until you can do so without too much pain. If the doctor has told you to use crutches, use them as instructed. · Put ice or a cold pack on your toe for 10 to 20 minutes at a time. Try to do this every 1 to 2 hours for the next 3 days (when you are awake) or until the swelling goes down. Put a thin cloth between the ice and your skin. · Prop up your foot on a pillow when you ice it or anytime you sit or lie down. Try to keep it above the level of your heart. This will help reduce swelling. · Make sure you go to your follow-up appointments. Your doctor will need to check that your toe is healing right. When should you call for help? Call your doctor now or seek immediate medical care if: 
  · You have severe pain.  
  · Your toe is cool or pale or changes color.  
  · You have tingling, weakness, or numbness in your toe.  
 Watch closely for changes in your health, and be sure to contact your doctor if: 
  · Pain and swelling get worse.  
  · You are not getting better as expected. Where can you learn more? Go to http://maninder-miko.info/. Enter O228 in the search box to learn more about \"Broken Toe: Care Instructions. \" Current as of: November 29, 2017 Content Version: 11.7 © 1329-5770 Pixtronix. Care instructions adapted under license by Profig (which disclaims liability or warranty for this information). If you have questions about a medical condition or this instruction, always ask your healthcare professional. [address] any warranty or liability for your use of this information. Introducing [facility] & HEALTH SERVICES! Dear Charmain Halsted: Thank you for requesting a Reaqua Systems account. Our records indicate that you already have an active Reaqua Systems account. You can access your account anytime at [email or web address] Body [email or web address] Design. Transluminal Technologies/Your Body by Design Did you know that you can access your hospital and ER discharge instructions at any time in Reaqua Systems? You can also review all of your test results from your hospital stay or ER visit. Additional Information If you have questions, please visit the Frequently Asked Questions section of the weendyhart website at [email or web address]. Lakeside Speech Language and Learning. [email or web address] Remember, Tobira Therapeutics is NOT to be used for urgent needs. For medical emergencies, dial 911. Now available from your iPhone and Android! Please provide this summary of care documentation to your next provider. Your primary care clinician is listed as Jaskaran Sneed. If you have any questions after today's visit, please call [phone].

## 2018-07-26 NOTE — PATIENT INSTRUCTIONS
Broken Toe: Care Instructions  Your Care Instructions  You have broken (fractured) a bone in your toe. This kind of fracture does not need a special cast or brace. \"Katrin-taping\" your broken toe to a healthy toe next to it is almost always enough to treat the problem and ease symptoms. The toe may take 4 weeks or more to heal.  You heal best when you take good care of yourself. Eat a variety of healthy foods, and don't smoke. Follow-up care is a key part of your treatment and safety. Be sure to make and go to all appointments, and call your doctor if you are having problems. It's also a good idea to know your test results and keep a list of the medicines you take. How can you care for yourself at home? · Be safe with medicines. Take pain medicines exactly as directed. ¨ If the doctor gave you a prescription medicine for pain, take it as prescribed. ¨ If you are not taking a prescription pain medicine, ask your doctor if you can take an over-the-counter medicine. · If your toe is taped to the toe next to it, your doctor has shown you how to change the tape. Protect the skin by putting something soft, such as felt or foam, between your toes before you tape them together. Never tape the toes together skin-to-skin. Your broken toe may need to be katrin-taped for 2 to 4 weeks to heal.  · Rest and protect your toe. Do not walk on it until you can do so without too much pain. If the doctor has told you to use crutches, use them as instructed. · Put ice or a cold pack on your toe for 10 to 20 minutes at a time. Try to do this every 1 to 2 hours for the next 3 days (when you are awake) or until the swelling goes down. Put a thin cloth between the ice and your skin. · Prop up your foot on a pillow when you ice it or anytime you sit or lie down. Try to keep it above the level of your heart. This will help reduce swelling. · Make sure you go to your follow-up appointments.  Your doctor will need to check that your toe is healing right. When should you call for help? Call your doctor now or seek immediate medical care if:    · You have severe pain.     · Your toe is cool or pale or changes color.     · You have tingling, weakness, or numbness in your toe.    Watch closely for changes in your health, and be sure to contact your doctor if:    · Pain and swelling get worse.     · You are not getting better as expected. Where can you learn more? Go to http://maninder-miko.info/. Enter R889 in the search box to learn more about \"Broken Toe: Care Instructions. \"  Current as of: November 29, 2017  Content Version: 11.7  © 1686-5697 [employer]. Care instructions adapted under license by [employer] (which disclaims liability or warranty for this information). If you have questions about a medical condition or this instruction, always ask your healthcare professional. [address] any warranty or liability for your use of this information.

## 2018-07-26 NOTE — PROGRESS NOTES
Chief Complaint   Patient presents with    Toe Injury     R 5th toe pain. Patient states that she smashed it on the door 1 week ago. 1. Have you been to the ER, urgent care clinic since your last visit? Hospitalized since your last visit? No    2. Have you seen or consulted any other health care providers outside of the [facility] since your last visit? Include any pap smears or colon screening.  No

## 2018-07-27 ENCOUNTER — TELEPHONE (OUTPATIENT)
Dept: FAMILY MEDICINE CLINIC | Age: 68
End: 2018-07-27

## 2018-07-27 NOTE — TELEPHONE ENCOUNTER
Patient is returning call from [place] about her little toe X-Ray .   Best call back : [phone]

## 2019-06-12 ENCOUNTER — HOSPITAL ENCOUNTER (OUTPATIENT)
Dept: LAB | Age: 69
Discharge: HOME | End: 2019-06-12
Payer: MEDICARE

## 2019-06-12 ENCOUNTER — OFFICE VISIT (OUTPATIENT)
Dept: FAMILY MEDICINE CLINIC | Age: 69
End: 2019-06-12

## 2019-06-12 VITALS
SYSTOLIC BLOOD PRESSURE: 129 MMHG | RESPIRATION RATE: 18 BRPM | TEMPERATURE: 98 F | DIASTOLIC BLOOD PRESSURE: 82 MMHG | BODY MASS INDEX: 22.66 KG/M2 | HEART RATE: 68 BPM | HEIGHT: 66 IN | WEIGHT: 141 LBS | OXYGEN SATURATION: 97 %

## 2019-06-12 DIAGNOSIS — E78.5 HYPERLIPIDEMIA, UNSPECIFIED HYPERLIPIDEMIA TYPE: Primary | ICD-10-CM

## 2019-06-12 DIAGNOSIS — E55.9 VITAMIN D DEFICIENCY: ICD-10-CM

## 2019-06-12 DIAGNOSIS — Z12.31 SCREENING MAMMOGRAM, ENCOUNTER FOR: ICD-10-CM

## 2019-06-12 DIAGNOSIS — Z01.818 PRE-OP TESTING: ICD-10-CM

## 2019-06-12 DIAGNOSIS — R53.82 CHRONIC FATIGUE: ICD-10-CM

## 2019-06-12 PROCEDURE — 36415 COLL VENOUS BLD VENIPUNCTURE: CPT

## 2019-06-12 PROCEDURE — 85027 COMPLETE CBC AUTOMATED: CPT

## 2019-06-12 PROCEDURE — 80053 COMPREHEN METABOLIC PANEL: CPT

## 2019-06-12 RX ORDER — GLUCOSAM/CHONDRO/HERB 149/HYAL 750-100 MG
1 TABLET ORAL DAILY
COMMUNITY
End: 2019-08-22

## 2019-06-12 NOTE — PATIENT INSTRUCTIONS
Home Blood Pressure Test: About This Test 
What is it? A home blood pressure test allows you to keep track of your blood pressure at home. Blood pressure is a measure of the force of blood against the walls of your arteries. Blood pressure readings include two numbers, such as 130/80 (say \"130 over 80\"). The first number is the systolic pressure. The second number is the diastolic pressure. Why is this test done? You may do this test at home to: · Find out if you have high blood pressure. · Track your blood pressure if you have high blood pressure. · Track how well medicine is working to reduce high blood pressure. · Check how lifestyle changes, such as weight loss and exercise, are affecting blood pressure. How can you prepare for the test? 
· Do not use caffeine, tobacco, or medicines known to raise blood pressure (such as nasal decongestant sprays) for at least 30 minutes before taking your blood pressure. · Do not exercise for at least 30 minutes before taking your blood pressure. What happens before the test? 
Take your blood pressure while you feel comfortable and relaxed. Sit quietly with both feet on the floor for at least 5 minutes before the test. 
What happens during the test? 
· Sit with your arm slightly bent and resting on a table so that your upper arm is at the same level as your heart. · Roll up your sleeve or take off your shirt to expose your upper arm. · Wrap the blood pressure cuff around your upper arm so that the lower edge of the cuff is about 1 inch above the bend of your elbow. Proceed with the following steps depending on if you are using an automatic or manual pressure monitor. Automatic blood pressure monitors · Press the on/off button on the automatic monitor and wait until the ready-to-measure \"heart\" symbol appears next to zero in the display window. · Press the start button. The cuff will inflate and deflate by itself. · Your blood pressure numbers will appear on the screen. · Write your numbers in your log book, along with the date and time. Manual blood pressure monitors · Place the earpieces of a stethoscope in your ears, and place the bell of the stethoscope over the artery, just below the cuff. · Close the valve on the rubber inflating bulb. · Squeeze the bulb rapidly with your opposite hand to inflate the cuff until the dial or column of mercury reads about 30 mm Hg higher than your usual systolic pressure. If you do not know your usual pressure, inflate the cuff to 210 mm Hg or until the pulse at your wrist disappears. · Open the pressure valve just slightly by twisting or pressing the valve on the bulb. · As you watch the pressure slowly fall, note the level on the dial at which you first start to hear a pulsing or tapping sound through the stethoscope. This is your systolic blood pressure. · Continue letting the air out slowly. The sounds will become muffled and will finally disappear. Note the pressure when the sounds completely disappear. This is your diastolic blood pressure. Let out all the remaining air. · Write your numbers in your log book, along with the date and time. What else should you know about the test? 
It is more accurate to take the average of several readings made throughout the day than to rely on a single reading. It's normal for blood pressure to go up and down throughout the day. Follow-up care is a key part of your treatment and safety. Be sure to make and go to all appointments, and call your doctor if you are having problems. It's also a good idea to keep a list of the medicines you take. Where can you learn more? Go to http://maninder-miko.info/. Enter C427 in the search box to learn more about \"Home Blood Pressure Test: About This Test.\" Current as of: July 22, 2018 Content Version: 11.9 © 3491-0213 [facility] [employer].  Care instructions adapted under license by [address] (which disclaims liability or warranty for this information). If you have questions about a medical condition or this instruction, always ask your healthcare professional. [address] any warranty or liability for your use of this information.

## 2019-06-12 NOTE — PROGRESS NOTES
HPI  Richelle Webber 70 y.o. female  presents to the office today for pre-op exam.     Blood pressure 129/82, pulse 68, temperature 98 °F (36.7 °C), resp. rate 18, height 5' 6\" (1.676 m), weight 141 lb (64 kg), SpO2 97 %. Body mass index is 22.76 kg/m². Chief Complaint   Patient presents with    Pre-op Exam     Having septoplasty resection of turbinate bones by Dr. Judith Bearden on 06/17/19        Pre-op exam: Pt reports that she is scheduled for a septoplasty resection of turbinate bones by ENT Dr. Judith Bearden on 6/17/19 at [facility]. Pt reports having chronic breathing problem since undergoing a surgery at [facility] years ago. Today's EKG indicated anteroseptal infarct but I cannot indicate what could be causing the abnormal EKG as pt denies SOB, CP or dizziness. I discussed with pt that she would benefit from follow up with cardiology for EKG evaluation. I went over the pre-op screening questionnaires with the pt thoroughly. I explained to pt the possible discomfort following the surgery, but ultimately the surgery should help improving her quality of life. I discussed with pt the importance of not taking any NSAIDs prior to the surgery. Vitamin D deficiency/Chronic fatigue: Pt's vitamin D levels were 28.5 on 7/20/18. Hyperlipidemia: Lipid panel on 4/13/17 notable for total cholesterol 292, HDL 88, , and triglycerides 84. Pt is not currently taking any cholesterol medications. Health Maintenance: Pt is due for mammogram. I discussed with pt the importance of the screening, and pt agrees with this. Pt reports experiencing some facial spasms for several months and visited neurology for evaluation. Pt states that the condition has been stable since then. Current Outpatient Medications   Medication Sig Dispense Refill    omega 3-DHA-EPA-fish oil 1,000 mg (120 mg-180 mg) capsule Take 1 Cap by mouth daily.       ASCORBATE CALCIUM (VITAMIN C PO) Take 1 Tab by mouth daily.      CALCIUM PO Take 600 mg by mouth daily.  loratadine (CLARITIN) 10 mg tablet Take 10 mg by mouth daily as needed. No Known Allergies  Past Medical History:   Diagnosis Date    Hyperlipidemia 2012     Past Surgical History:   Procedure Laterality Date    ENDOSCOPY, COLON, DIAGNOSTIC      polyp Follow up 2012; Dr. Alba Feliciano    HX GYN      HX HEENT  9/10    cholesteatoma removal and ruptured ear drum     Family History   Problem Relation Age of Onset    Cancer Maternal Aunt         breast     Social History     Tobacco Use    Smoking status: Former Smoker     Last attempt to quit: 1990     Years since quittin.4    Smokeless tobacco: Never Used   Substance Use Topics    Alcohol use: Yes     Comment: ocassionally/ once per week        Review of Systems   Constitutional: Negative for chills and fever. HENT: Negative for hearing loss and tinnitus. Eyes: Negative for blurred vision and double vision. Respiratory: Negative for cough and shortness of breath. Cardiovascular: Negative for chest pain and palpitations. Gastrointestinal: Negative for nausea and vomiting. Genitourinary: Negative for dysuria and frequency. Musculoskeletal: Negative for back pain and falls. Skin: Negative for itching and rash. Neurological: Negative for dizziness, loss of consciousness and headaches. Psychiatric/Behavioral: Negative for depression. The patient is not nervous/anxious. Physical Exam   Constitutional: She is oriented to person, place, and time. She appears well-developed and well-nourished. HENT:   Head: Normocephalic and atraumatic. Right Ear: External ear normal.   Left Ear: External ear normal.   Nose: Nose normal.   Mouth/Throat: Oropharynx is clear and moist.   Eyes: Conjunctivae and EOM are normal.   Neck: Normal range of motion. Neck supple. Cardiovascular: Normal rate, regular rhythm, normal heart sounds and intact distal pulses.    Pulmonary/Chest: Effort normal and breath sounds normal.   Abdominal: Soft. Bowel sounds are normal.   Musculoskeletal: Normal range of motion. Neurological: She is alert and oriented to person, place, and time. Skin: Skin is warm and dry. Psychiatric: She has a normal mood and affect. Her behavior is normal. Judgment and thought content normal.   Nursing note and vitals reviewed. ASSESSMENT and PLAN  Diagnoses and all orders for this visit:    1. Hyperlipidemia, unspecified hyperlipidemia type  Presumed stable, will continue to monitor the condition. 2. Chronic fatigue  Presumed stable, will continue to monitor the condition. 3. Vitamin D deficiency   Presumed stable, will continue to monitor the condition. 4. Pre-op testing  Pt is clear to proceed with surgery if lab results show no abnormalities. -     CBC W/O DIFF  -     METABOLIC PANEL, COMPREHENSIVE  -     AMB POC EKG ROUTINE W/ 12 LEADS, INTER & REP    5. Screening mammogram, encounter for  -     [facility] MAMMO BI SCREENING INCL CAD; Future    Follow-up and Dispositions    · Return if symptoms worsen or fail to improve, for as needed. Medication risks/benefits/costs/interactions/alternatives discussed with patient. Advised patient to call back or return to office if symptoms worsen/change/persist.  If patient cannot reach us or should anything more severe/urgent arise he/she should proceed directly to the nearest emergency department. Discussed expected course/resolution/complications of diagnosis in detail with patient. Patient given a written after visit summary which includes her diagnoses, current medications and vitals. Patient expressed understanding with the diagnosis and plan. Written by dirk Barahona, as dictated by Al Tyler M.D.    5:07 PM - 5:25 PM    Total time spent with the patient 18 minutes, greater than 50% of time spent counseling patient.

## 2019-06-12 NOTE — PROGRESS NOTES
Chief Complaint   Patient presents with    Pre-op Exam     Having septoplasty resection of turbinate bones by Dr. Carolin Gaines on 19       Reviewed Record in preparation for visit and have obtained necessary documentation. Identified pt with two pt identifiers (Name @ )    Health Maintenance Due   Topic    Shingrix Vaccine Age 50> (1 of 2)    GLAUCOMA SCREENING Q2Y     Bone Densitometry (Dexa) Screening     Pneumococcal 65+ years (2 of 2 - PCV13)    BREAST CANCER SCRN MAMMOGRAM     MEDICARE YEARLY EXAM          1. Have you been to the ER, urgent care clinic since your last visit? Hospitalized since your last visit? no    2. Have you seen or consulted any other health care providers outside of the [address] [place] [address] since your last visit? Include any pap smears or colon screening.   no

## 2019-06-12 NOTE — PROGRESS NOTES
Preoperative Evaluation    Date of Exam: 2019    Mary Anne Morrison is a 70 y.o. female (:1950) who presents for preoperative evaluation. Procedure/Surgery:septoplast  Date of Procedure/Surgery: 19  Surgeon: Vi Mace MD  Hospital/Surgical Facility: [facility]  Primary Physician: Juan Jose Smith MD  Latex Allergy: no    Problem List:     Patient Active Problem List    Diagnosis Date Noted    ACP (advance care planning) 2017    Hyperlipidemia 2012     Medical History:     Past Medical History:   Diagnosis Date    Hyperlipidemia 2012     Allergies:   No Known Allergies   Medications:     Current Outpatient Medications   Medication Sig    omega 3-DHA-EPA-fish oil 1,000 mg (120 mg-180 mg) capsule Take 1 Cap by mouth daily.  ASCORBATE CALCIUM (VITAMIN C PO) Take 1 Tab by mouth daily.  CALCIUM PO Take 600 mg by mouth daily.  loratadine (CLARITIN) 10 mg tablet Take 10 mg by mouth daily as needed. No current facility-administered medications for this visit. Surgical History:     Past Surgical History:   Procedure Laterality Date    ENDOSCOPY, COLON, DIAGNOSTIC  2007    [facility]yp Follow up 2012;  [facility]    HX GYN      HX HEENT  9/10    cholesteatoma removal and ruptured ear drum     Social History:     Social History     Socioeconomic History    Marital status:      Spouse name: Ivonne Zelaya    Number of children: 2    Years of education: Not on file    Highest education level: Not on file   Occupational History    Occupation: business   Tobacco Use    Smoking status: Former Smoker     Last attempt to quit: 1990     Years since quittin.4    Smokeless tobacco: Never Used   Substance and Sexual Activity    Alcohol use: Yes     Comment: ocassionally/ once per week    Drug use: No    Sexual activity: Yes     Partners: Male     Comment:    Other Topics Concern     Service No    Blood Transfusions No    Caffeine Concern No    Occupational Exposure No    Hobby Hazards Yes     Comment:     Sleep Concern No    Stress Concern No    Weight Concern No    Special Diet No    Back Care No    Exercise Yes     Comment: Tennis, Swimming, Walking    Bike Helmet Yes    Seat Belt Yes    Self-Exams No     Comment: Never remembers       Recent use of: No recent use of aspirin (ASA), NSAIDS or steroids          Anesthesia Complications: None  History of abnormal bleeding : None  History of Blood Transfusions: no  Health Care Directive or Living Will: no    REVIEW OF SYSTEMS:  A comprehensive review of systems was negative.     EXAM:   Visit Vitals  Ht 5' 6\" (1.676 m)   Wt 141 lb (64 kg)   BMI 22.76 kg/m²     General appearance - alert, well appearing, and in no distress and oriented to person, place, and time  Mental status - alert, oriented to person, place, and time  Eyes - pupils equal and reactive, extraocular eye movements intact  Ears - bilateral TM's and external ear canals normal  Nose - normal and patent, no erythema, discharge or polyps  Mouth - mucous membranes moist, pharynx normal without lesions  Neck - supple, no significant adenopathy  Chest - clear to auscultation, no wheezes, rales or rhonchi, symmetric air entry  Heart - normal rate, regular rhythm, normal S1, S2, no murmurs, rubs, clicks or gallops  Abdomen - soft, nontender, nondistended, no masses or organomegaly  Neurological - alert, oriented, normal speech, no focal findings or movement disorder noted, cranial nerves II through XII intact          IMPRESSION:   -  EKG advised pt to get cardiac clearance prior to surgery      Micah Banks MD   6/12/2019

## 2019-06-13 ENCOUNTER — TELEPHONE (OUTPATIENT)
Dept: FAMILY MEDICINE CLINIC | Age: 69
End: 2019-06-13

## 2019-06-13 DIAGNOSIS — R94.31 ABNORMAL EKG: Primary | ICD-10-CM

## 2019-06-13 LAB
ALBUMIN SERPL-MCNC: 4.6 G/DL (ref 3.6–4.8)
ALBUMIN/GLOB SERPL: 2.3 {RATIO} (ref 1.2–2.2)
ALP SERPL-CCNC: 88 IU/L (ref 39–117)
ALT SERPL-CCNC: 12 IU/L (ref 0–32)
AST SERPL-CCNC: 18 IU/L (ref 0–40)
BILIRUB SERPL-MCNC: 0.3 MG/DL (ref 0–1.2)
BUN SERPL-MCNC: 16 MG/DL (ref 8–27)
BUN/CREAT SERPL: 19 (ref 12–28)
CALCIUM SERPL-MCNC: 9.4 MG/DL (ref 8.7–10.3)
CHLORIDE SERPL-SCNC: 104 MMOL/L (ref 96–106)
CO2 SERPL-SCNC: 26 MMOL/L (ref 20–29)
CREAT SERPL-MCNC: 0.84 MG/DL (ref 0.57–1)
ERYTHROCYTE [DISTWIDTH] IN BLOOD BY AUTOMATED COUNT: 12.6 % (ref 12.3–15.4)
GLOBULIN SER CALC-MCNC: 2 G/DL (ref 1.5–4.5)
GLUCOSE SERPL-MCNC: 82 MG/DL (ref 65–99)
HCT VFR BLD AUTO: 42 % (ref 34–46.6)
HGB BLD-MCNC: 14.1 G/DL (ref 11.1–15.9)
MCH RBC QN AUTO: 31.2 PG (ref 26.6–33)
MCHC RBC AUTO-ENTMCNC: 33.6 G/DL (ref 31.5–35.7)
MCV RBC AUTO: 93 FL (ref 79–97)
PLATELET # BLD AUTO: 227 X10E3/UL (ref 150–450)
POTASSIUM SERPL-SCNC: 4.7 MMOL/L (ref 3.5–5.2)
PROT SERPL-MCNC: 6.6 G/DL (ref 6–8.5)
RBC # BLD AUTO: 4.52 X10E6/UL (ref 3.77–5.28)
SODIUM SERPL-SCNC: 143 MMOL/L (ref 134–144)
WBC # BLD AUTO: 7.3 X10E3/UL (ref 3.4–10.8)

## 2019-06-13 NOTE — TELEPHONE ENCOUNTER
Per Dr Celestino Rai he talked to cardiologist and patient needs cardiologist clearance for her surgery.  Per Dr Celestino Rai patient needs to delay surgery till she is evaluated by Cardiologist.    Per Dr Celestino Rai ok to refer to Dr Pamela Stinson    [phone] notified patient of Dr Celestino Rai recommendation   Notified patient of Dr Jaime Valentin appointment and she will call surgeon to delay surgery

## 2019-06-13 NOTE — TELEPHONE ENCOUNTER
Pt. is calling requesting a call back in regards to know the results of her EKG. Pt. Saw dr. Joan Rose yesterday and was told that he will send her EKG to her cardiology and then let her know the status on it before her surgery. surgery is on Monday.     Best call #[phone]

## 2019-06-19 ENCOUNTER — OFFICE VISIT (OUTPATIENT)
Dept: CARDIOLOGY CLINIC | Age: 69
End: 2019-06-19

## 2019-06-19 VITALS
BODY MASS INDEX: 22 KG/M2 | WEIGHT: 140.2 LBS | RESPIRATION RATE: 16 BRPM | SYSTOLIC BLOOD PRESSURE: 110 MMHG | DIASTOLIC BLOOD PRESSURE: 70 MMHG | OXYGEN SATURATION: 98 % | HEIGHT: 67 IN | HEART RATE: 68 BPM

## 2019-06-19 DIAGNOSIS — Z87.891 HISTORY OF TOBACCO USE: ICD-10-CM

## 2019-06-19 DIAGNOSIS — Z01.810 PREOP CARDIOVASCULAR EXAM: ICD-10-CM

## 2019-06-19 DIAGNOSIS — R94.31 ABNORMAL EKG: Primary | ICD-10-CM

## 2019-06-19 DIAGNOSIS — R06.02 SHORTNESS OF BREATH: ICD-10-CM

## 2019-06-19 NOTE — PROGRESS NOTES
[place] [address]: Brody Vega  [id number]    History of Present Illness:  Ms. Maryanne Blake is a 69 yo F with a history of tobacco use, here for preop cardiac evaluation prior to septoplasty resection with Dr. Reagan Nicolas, referred by Dr. Og York. She is here due to abnormal preop EKG that noted possible anteroseptal infarct. From a cardiac standpoint, she denies any prior cardiac history. She denies any exertional chest pain. She does have some shortness of breath, but attributes this to her nasal issue. No significant palpitations, lightheadedness or dizziness. She is very active and does exercise on a regular basis without problems. She is compensated on exam with clear lungs and no lower extremity edema. Her blood pressure is 110/70 with a heart rate of 68. I reviewed personally her EKG from 06/12/2019 and this was normal sinus rhythm. There was a Q-wave in V1, V2 and V3 consistent with possible old anteroseptal infarct. Soc hx. Quit tobacco 30 yrs  Fam hx. No early CAD. Assessment and Plan:    1. Preop cardiac evaluation. Overall she is very active and does not have major cardiac symptoms. She is stable cardiac wise and low risk for cardiac complications and no additional evaluation is indicated prior to surgery. Will send a report to Dr. Reagan Nicolas and Dr. Og York. 2. Abnormal EKG. This was an incidental finding. Will obtain an echocardiogram to make sure she has not had significant cardiac involvement in the past.  She does not need to get this echocardiogram prior to surgery and we discussed this. If this is normal, she can follow here on an as needed basis. 3. History of tobacco use. She  has a past medical history of Adverse effect of anesthesia and Hyperlipidemia (7/9/2012).  She also has no past medical history of Abuse, Anemia NEC, Calculus of kidney, Congestive heart failure, unspecified, Contact dermatitis and other eczema, due to unspecified cause, COPD, Depression, Headache(784.0), Psychotic disorder ([address] or Trauma. All other systems negative except as above. PE  Vitals:    19 1425   BP: 110/70   Pulse: 68   Resp: 16   SpO2: 98%   Weight: 140 lb 3.2 oz (63.6 kg)   Height: 5' 6.5\" (1.689 m)    Body mass index is 22.29 kg/m².    General appearance - alert, well appearing, and in no distress  Mental status - affect appropriate to mood  Eyes - sclera anicteric, moist mucous membranes  Neck - supple, no JVD  Chest - clear to auscultation, no wheezes, rales or rhonchi  Heart - normal rate, regular rhythm, normal S1, S2, no murmurs, rubs, clicks or gallops  Abdomen - soft, nontender, nondistended, no masses or organomegaly  Neurological -no focal deficit  Extremities - peripheral pulses normal, no pedal edema    Recent Labs:  Lab Results   Component Value Date/Time    Cholesterol, total 292 (H) 2017 11:29 AM    HDL Cholesterol 88 2017 11:29 AM    LDL, calculated 187 (H) 2017 11:29 AM    Triglyceride 84 2017 11:29 AM     Lab Results   Component Value Date/Time    Creatinine 0.84 2019 05:34 PM     Lab Results   Component Value Date/Time    BUN 16 2019 05:34 PM     Lab Results   Component Value Date/Time    Potassium 4.7 2019 05:34 PM     No results found for: HBA1C, HGBE8, YHG5PFHT, BKC2OHEQ  Lab Results   Component Value Date/Time    HGB 14.1 2019 05:34 PM     Lab Results   Component Value Date/Time    PLATELET 316  05:34 PM       Reviewed:  Past Medical History:   Diagnosis Date    Adverse effect of anesthesia     HARD TO WAKE    Hyperlipidemia 2012     Social History     Tobacco Use   Smoking Status Former Smoker    Packs/day: 0.25    Years: 21.00    Pack years: 5.25    Last attempt to quit: 1988    Years since quittin.4   Smokeless Tobacco Never Used     Social History     Substance and Sexual Activity   Alcohol Use Yes    Frequency: 2-4 times a month    Comment: ocassionally/ once per week No Known Allergies    Current Outpatient Medications   Medication Sig    omega 3-DHA-EPA-fish oil 1,000 mg (120 mg-180 mg) capsule Take 1 Cap by mouth daily.  ASCORBATE CALCIUM (VITAMIN C PO) Take 1 Tab by mouth daily.  CALCIUM PO Take 600 mg by mouth daily.  loratadine (CLARITIN) 10 mg tablet Take 10 mg by mouth daily as needed. No current facility-administered medications for this visit.         Drue Scheuermann, MD  [facility] heart and [facility] [place]  [address], [address]

## 2019-06-19 NOTE — LETTER
6/20/2019 12:27 AM 
 
Patient:  Reji Louis YOB: 1950 Date of Visit: 6/19/2019 Dear MD Alessia Amanda [address] [address] VIA In Basket Samina Burnette MD 
[id number] [facility] [address] [id number] VIA In Basket 
 : Thank you for referring Ms. Mario Lutz to me for evaluation/treatment. Below are the relevant portions of my assessment and plan of care. Ms. Helene Allen is a 69 yo F with a history of tobacco use, here for preop cardiac evaluation prior to septoplasty resection with Dr. Vaibhav Hickey, referred by Dr. Celestino Rai. She is here due to abnormal preop EKG that noted possible anteroseptal infarct. From a cardiac standpoint, she denies any prior cardiac history. She denies any exertional chest pain. She does have some shortness of breath, but attributes this to her nasal issue. No significant palpitations, lightheadedness or dizziness. She is very active and does exercise on a regular basis without problems. She is compensated on exam with clear lungs and no lower extremity edema. Her blood pressure is 110/70 with a heart rate of 68. I reviewed personally her EKG from 06/12/2019 and this was normal sinus rhythm. There was a Q-wave in V1, V2 and V3 consistent with possible old anteroseptal infarct. Soc hx. Quit tobacco 30 yrs Fam hx. No early CAD. Assessment and Plan: 1. Preop cardiac evaluation. Overall she is very active and does not have major cardiac symptoms. She is stable cardiac wise and low risk for cardiac complications and no additional evaluation is indicated prior to surgery. Will send a report to Dr. Vaibhav Hickey and Dr. Celestino Rai. 2. Abnormal EKG. This was an incidental finding. Will obtain an echocardiogram to make sure she has not had significant cardiac involvement in the past.  She does not need to get this echocardiogram prior to surgery and we discussed this. If this is normal, she can follow here on an as needed basis. 3. History of tobacco use. If you have questions, please do not hesitate to call me. Sincerely, Ron Merritt MD

## 2019-06-25 ENCOUNTER — TELEPHONE (OUTPATIENT)
Dept: CARDIOLOGY CLINIC | Age: 69
End: 2019-06-25

## 2019-06-25 NOTE — TELEPHONE ENCOUNTER
Patient states she needs the things that were faxed to her primary care (fax at [phone])  after her last appointment also sent to the surgeon she was meant to have surgery with.   [address] [facility] fax: [phone] attn Thor De Dios

## 2019-07-02 ENCOUNTER — PATIENT MESSAGE (OUTPATIENT)
Dept: CARDIOLOGY CLINIC | Age: 69
End: 2019-07-02

## 2019-07-02 ENCOUNTER — TELEPHONE (OUTPATIENT)
Dept: FAMILY MEDICINE CLINIC | Age: 69
End: 2019-07-02

## 2019-07-02 NOTE — TELEPHONE ENCOUNTER
[phone] attempted to call patient no answer left message on her private VM notified of her appointment 8/5/2019 at [place] [address] for Pre op and AWV and if she has a problem with that appointment to call us back

## 2019-07-02 NOTE — TELEPHONE ENCOUNTER
----- Message from Sarah Kelley sent at 7/2/2019  1:49 PM EDT -----  Regarding: Dr. Purvi Carr  Contact: [phone]  ----- Message from [address] [place] [address] [address], [facility] sent at 7/2/2019  8:41 AM EDT -----    Appointment Request From: Max Morelos    With Provider: Gege Ngo MD [-Primary Care Physician-]    Preferred Date Range: From 8/5/2019 To 8/9/2019    Preferred Times: Any    Reason: To address the following health maintenance concerns. Medicare Yearly Exam    Comments:  I will need another pre-op for a rescheduled nasal surgery on August 22. Romero Mendosa The week of 8/5/- 8/9 would be perfect. Romero Vega      No appt. Is available with dr. Eamon Pena. Please advice.

## 2019-08-05 ENCOUNTER — OFFICE VISIT (OUTPATIENT)
Dept: FAMILY MEDICINE CLINIC | Age: 69
End: 2019-08-05

## 2019-08-05 VITALS
HEART RATE: 69 BPM | RESPIRATION RATE: 18 BRPM | OXYGEN SATURATION: 98 % | BODY MASS INDEX: 22.03 KG/M2 | SYSTOLIC BLOOD PRESSURE: 128 MMHG | DIASTOLIC BLOOD PRESSURE: 89 MMHG | HEIGHT: 67 IN | TEMPERATURE: 97.4 F | WEIGHT: 140.4 LBS

## 2019-08-05 DIAGNOSIS — E78.5 HYPERLIPIDEMIA, UNSPECIFIED HYPERLIPIDEMIA TYPE: Primary | ICD-10-CM

## 2019-08-05 DIAGNOSIS — E55.9 VITAMIN D DEFICIENCY: ICD-10-CM

## 2019-08-05 DIAGNOSIS — Z01.818 PRE-OP TESTING: ICD-10-CM

## 2019-08-05 NOTE — PROGRESS NOTES
Chief Complaint   Patient presents with    Pre-op Exam     Nasal surgery, septoplasty for 19       Reviewed Record in preparation for visit and have obtained necessary documentation. Identified pt with two pt identifiers (Name @ )    Health Maintenance Due   Topic    Bone Densitometry (Dexa) Screening     BREAST CANCER SCRN MAMMOGRAM     MEDICARE YEARLY EXAM          1. Have you been to the ER, urgent care clinic since your last visit? Hospitalized since your last visit? no        2. Have you seen or consulted any other health care providers outside of the [address] [place] [address] since your last visit? Include any pap smears or colon screening.  no

## 2019-08-05 NOTE — PATIENT INSTRUCTIONS
DASH Diet: Care Instructions Your Care Instructions The DASH diet is an eating plan that can help lower your blood pressure. DASH stands for Dietary Approaches to Stop Hypertension. Hypertension is high blood pressure. The DASH diet focuses on eating foods that are high in calcium, potassium, and magnesium. These nutrients can lower blood pressure. The foods that are highest in these nutrients are fruits, vegetables, low-fat dairy products, nuts, seeds, and legumes. But taking calcium, potassium, and magnesium supplements instead of eating foods that are high in those nutrients does not have the same effect. The DASH diet also includes whole grains, fish, and poultry. The DASH diet is one of several lifestyle changes your doctor may recommend to lower your high blood pressure. Your doctor may also want you to decrease the amount of sodium in your diet. Lowering sodium while following the DASH diet can lower blood pressure even further than just the DASH diet alone. Follow-up care is a key part of your treatment and safety. Be sure to make and go to all appointments, and call your doctor if you are having problems. It's also a good idea to know your test results and keep a list of the medicines you take. How can you care for yourself at home? Following the DASH diet · Eat 4 to 5 servings of fruit each day. A serving is 1 medium-sized piece of fruit, ½ cup chopped or canned fruit, 1/4 cup dried fruit, or 4 ounces (½ cup) of fruit juice. Choose fruit more often than fruit juice. · Eat 4 to 5 servings of vegetables each day. A serving is 1 cup of lettuce or raw leafy vegetables, ½ cup of chopped or cooked vegetables, or 4 ounces (½ cup) of vegetable juice. Choose vegetables more often than vegetable juice. · Get 2 to 3 servings of low-fat and fat-free dairy each day. A serving is 8 ounces of milk, 1 cup of yogurt, or 1 ½ ounces of cheese. · Eat 6 to 8 servings of grains each day. A serving is 1 slice of bread, 1 ounce of dry cereal, or ½ cup of cooked rice, pasta, or cooked cereal. Try to choose whole-grain products as much as possible. · Limit lean meat, poultry, and fish to 2 servings each day. A serving is 3 ounces, about the size of a deck of cards. · Eat 4 to 5 servings of nuts, seeds, and legumes (cooked dried beans, lentils, and split peas) each week. A serving is 1/3 cup of nuts, 2 tablespoons of seeds, or ½ cup of cooked beans or peas. · Limit fats and oils to 2 to 3 servings each day. A serving is 1 teaspoon of vegetable oil or 2 tablespoons of salad dressing. · Limit sweets and added sugars to 5 servings or less a week. A serving is 1 tablespoon jelly or jam, ½ cup sorbet, or 1 cup of lemonade. · Eat less than 2,300 milligrams (mg) of sodium a day. If you limit your sodium to 1,500 mg a day, you can lower your blood pressure even more. Tips for success · Start small. Do not try to make dramatic changes to your diet all at once. You might feel that you are missing out on your favorite foods and then be more likely to not follow the plan. Make small changes, and stick with them. Once those changes become habit, add a few more changes. · Try some of the following: ? Make it a goal to eat a fruit or vegetable at every meal and at snacks. This will make it easy to get the recommended amount of fruits and vegetables each day. ? Try yogurt topped with fruit and nuts for a snack or healthy dessert. ? Add lettuce, tomato, cucumber, and onion to sandwiches. ? Combine a ready-made pizza crust with low-fat mozzarella cheese and lots of vegetable toppings. Try using tomatoes, squash, spinach, broccoli, carrots, cauliflower, and onions. ? Have a variety of cut-up vegetables with a low-fat dip as an appetizer instead of chips and dip. ? Sprinkle sunflower seeds or chopped almonds over salads.  Or try adding chopped walnuts or almonds to cooked vegetables. ? Try some vegetarian meals using beans and peas. Add garbanzo or kidney beans to salads. Make burritos and tacos with mashed quiros beans or black beans. Where can you learn more? Go to http://maninder-miko.info/. Enter Y767 in the search box to learn more about \"DASH Diet: Care Instructions. \" Current as of: July 22, 2018 Content Version: 12.1 © 8579-0181 [employer]. Care instructions adapted under license by [employer] (which disclaims liability or warranty for this information). If you have questions about a medical condition or this instruction, always ask your healthcare professional. [address] any warranty or liability for your use of this information.

## 2019-08-05 NOTE — PROGRESS NOTES
Preoperative Evaluation    Date of Exam: 2019    Eusebia Hobbs is a 70 y.o. female (:1950) who presents for preoperative evaluation. Procedure/Surgery:Septoplast/ repair vestibularaft stenosis,turbinates,cartilege gr  Date of Procedure/Surgery: 19  Surgeon: Dr. Chad Young MD  Hospital/Surgical Facility: [facility]  Primary Physician: Chino Ding MD  Latex Allergy: no    Problem List:     Patient Active Problem List    Diagnosis Date Noted    ACP (advance care planning) 2017    Hyperlipidemia 2012     Medical History:     Past Medical History:   Diagnosis Date    Adverse effect of anesthesia     HARD TO WAKE    Hyperlipidemia 2012     Allergies:   No Known Allergies   Medications:     Current Outpatient Medications   Medication Sig    omega 3-DHA-EPA-fish oil 1,000 mg (120 mg-180 mg) capsule Take 1 Cap by mouth daily.  ASCORBATE CALCIUM (VITAMIN C PO) Take 1 Tab by mouth daily.  CALCIUM PO Take 600 mg by mouth daily.  loratadine (CLARITIN) 10 mg tablet Take 10 mg by mouth daily as needed. No current facility-administered medications for this visit. Surgical History:     Past Surgical History:   Procedure Laterality Date    ENDOSCOPY, COLON, DIAGNOSTIC      polyp Follow up 2012;  Dr. Neema Noel      X2    HX HEENT  9/10    cholesteatoma removal and ruptured ear drum    HX HEENT      NASAL REPAIR    HX TONSILLECTOMY       Social History:     Social History     Socioeconomic History    Marital status:      Spouse name: Jerardo Mayo    Number of children: 2    Years of education: Not on file    Highest education level: Not on file   Occupational History    Occupation: business   Tobacco Use    Smoking status: Former Smoker     Packs/day: 0.25     Years: 21.00     Pack years: 5.25     Last attempt to quit: 1988     Years since quittin.6    Smokeless tobacco: Never Used   Substance and Sexual Activity    Alcohol use: Yes     Frequency: 2-4 times a month     Comment: ocassionally/ once per week    Drug use: No    Sexual activity: Yes     Partners: Male     Comment:    Other Topics Concern     Service No    Blood Transfusions No    Caffeine Concern No    Occupational Exposure No    Hobby Hazards Yes     Comment:     Sleep Concern No    Stress Concern No    Weight Concern No    Special Diet No    Back Care No    Exercise Yes     Comment: Tennis, Swimming, Walking    Bike Helmet Yes    Seat Belt Yes    Self-Exams No     Comment: Never remembers       Recent use of: No recent use of aspirin (ASA), NSAIDS or steroids          Anesthesia Complications: None  History of abnormal bleeding : None  History of Blood Transfusions: no  Health Care Directive or Living Will: no    REVIEW OF SYSTEMS:  A comprehensive review of systems was negative.     EXAM:   Visit Vitals  /89 (BP 1 Location: Right arm, BP Patient Position: Sitting)   Pulse 69   Temp 97.4 °F (36.3 °C) (Oral)   Resp 18   Ht 5' 6.5\" (1.689 m)   Wt 140 lb 6.4 oz (63.7 kg)   SpO2 98%   BMI 22.32 kg/m²     General appearance - alert, well appearing, and in no distress and oriented to person, place, and time  Mental status - alert, oriented to person, place, and time  Eyes - pupils equal and reactive, extraocular eye movements intact  Ears - bilateral TM's and external ear canals normal  Nose - normal and patent, no erythema, discharge or polyps  Mouth - mucous membranes moist, pharynx normal without lesions  Neck - supple, no significant adenopathy  Lymphatics - no palpable lymphadenopathy, no hepatosplenomegaly  Chest - clear to auscultation, no wheezes, rales or rhonchi, symmetric air entry  Heart - normal rate, regular rhythm, normal S1, S2, no murmurs, rubs, clicks or gallops  Abdomen - soft, nontender, nondistended, no masses or organomegaly  Musculoskeletal - no joint tenderness, deformity or swelling  Extremities - peripheral pulses normal, no pedal edema, no clubbing or cyanosis          IMPRESSION:   None  No contraindications to planned surgery    Margarita Cabrera MD   8/5/2019

## 2019-08-05 NOTE — PROGRESS NOTES
HPI  Ashutosh Sorenson 70 y.o. female  presents to the office today for pre-op exam.    Blood pressure 128/89, pulse 69, temperature 97.4 °F (36.3 °C), temperature source Oral, resp. rate 18, height 5' 6.5\" (1.689 m), weight 140 lb 6.4 oz (63.7 kg), SpO2 98 %. Body mass index is 22.32 kg/m². Chief Complaint   Patient presents with    Pre-op Exam     Nasal surgery, septoplasty for 08/22/19        Pt is scheduled for a septoplasty on 8/22 by Dr. Krishan Mathews. Pre-op questionnaire discussed with pt and pre-op exam performed in office today    Hyperlipidemia: Lipid panel on 4/13/17 notable for total cholesterol 292, HDL 88, , and triglycerides 84. Presumed stable. Vitamin D deficiency: Pt's vitamin D levels were 28.5 on 7/20/18. Presumed stable. Current Outpatient Medications   Medication Sig Dispense Refill    omega 3-DHA-EPA-fish oil 1,000 mg (120 mg-180 mg) capsule Take 1 Cap by mouth daily.  ASCORBATE CALCIUM (VITAMIN C PO) Take 1 Tab by mouth daily.  CALCIUM PO Take 600 mg by mouth daily.  loratadine (CLARITIN) 10 mg tablet Take 10 mg by mouth daily as needed. No Known Allergies  Past Medical History:   Diagnosis Date    Adverse effect of anesthesia     HARD TO WAKE    Hyperlipidemia 7/9/2012     Past Surgical History:   Procedure Laterality Date    ENDOSCOPY, COLON, DIAGNOSTIC  2007    polyp Follow up 9/2012;  Dr. Lkoesh Trujillo HX HEENT  9/10    cholesteatoma removal and ruptured ear drum    HX HEENT  2005    NASAL REPAIR    HX TONSILLECTOMY       Family History   Problem Relation Age of Onset    No Known Problems Mother     Cancer Maternal Aunt         breast    No Known Problems Sister     No Known Problems Sister     No Known Problems Daughter     No Known Problems Daughter     Anesth Problems Neg Hx      Social History     Tobacco Use    Smoking status: Former Smoker     Packs/day: 0.25     Years: 21.00     Pack years: 5.25     Last attempt to quit: 1988     Years since quittin.6    Smokeless tobacco: Never Used   Substance Use Topics    Alcohol use: Yes     Frequency: 2-4 times a month     Comment: ocassionally/ once per week        Review of Systems   Constitutional: Negative for chills and fever. HENT: Negative for hearing loss and tinnitus. Eyes: Negative for blurred vision and double vision. Respiratory: Negative for cough and shortness of breath. Cardiovascular: Negative for chest pain and palpitations. Gastrointestinal: Negative for nausea and vomiting. Genitourinary: Negative for dysuria and frequency. Musculoskeletal: Negative for back pain and falls. Skin: Negative for itching and rash. Neurological: Negative for dizziness, loss of consciousness and headaches. Psychiatric/Behavioral: Negative for depression. The patient is not nervous/anxious. Physical Exam   Constitutional: She is oriented to person, place, and time. She appears well-developed and well-nourished. HENT:   Head: Normocephalic and atraumatic. Right Ear: External ear normal.   Left Ear: External ear normal.   Nose: Nose normal.   Mouth/Throat: Oropharynx is clear and moist.   Eyes: Conjunctivae and EOM are normal.   Neck: Normal range of motion. Neck supple. Cardiovascular: Normal rate, regular rhythm, normal heart sounds and intact distal pulses. Pulmonary/Chest: Effort normal and breath sounds normal.   Abdominal: Soft. Bowel sounds are normal.   Musculoskeletal: Normal range of motion. Neurological: She is alert and oriented to person, place, and time. Skin: Skin is warm and dry. Psychiatric: She has a normal mood and affect. Her behavior is normal. Judgment and thought content normal.   Nursing note and vitals reviewed. ASSESSMENT and PLAN  Diagnoses and all orders for this visit:    1. Hyperlipidemia, unspecified hyperlipidemia type  Presumed stable. 2. Vitamin D deficiency   Presumed stable.      3. Pre-op testing  Pre-op questionnaire discussed with pt and pre-op exam performed in office today    Follow-up and Dispositions    · Return in about 1 year (around 8/5/2020) for physical exam.        Medication risks/benefits/costs/interactions/alternatives discussed with patient. Advised patient to call back or return to office if symptoms worsen/change/persist.  If patient cannot reach us or should anything more severe/urgent arise he/she should proceed directly to the nearest emergency department. Discussed expected course/resolution/complications of diagnosis in detail with patient. Patient given a written after visit summary which includes her diagnoses, current medications and vitals. Patient expressed understanding with the diagnosis and plan.     Written by dirk Cruz, as dictated by Jaswant Saba M.D.

## 2019-08-19 NOTE — PERIOP NOTES
PAT TELEPHONE INTERVIEW COMPLETED. PRE-OP INSTRUCTIONS REVIEWED WITH PATIENT WHICH INCLUDED INSTRUCTIONS ON MEDICATIONS AND NPO 8 HOURS PRIOR TO SURGERY. PATIENT VOICED UNDERSTANDING OF SAME.

## 2019-08-22 ENCOUNTER — HOSPITAL ENCOUNTER (OUTPATIENT)
Age: 69
Setting detail: OUTPATIENT SURGERY
Discharge: HOME | End: 2019-08-22
Attending: OTOLARYNGOLOGY | Admitting: OTOLARYNGOLOGY
Payer: MEDICARE

## 2019-08-22 ENCOUNTER — ANESTHESIA EVENT (OUTPATIENT)
Dept: SURGERY | Age: 69
End: 2019-08-22
Payer: MEDICARE

## 2019-08-22 ENCOUNTER — ANESTHESIA (OUTPATIENT)
Dept: SURGERY | Age: 69
End: 2019-08-22
Payer: MEDICARE

## 2019-08-22 VITALS
RESPIRATION RATE: 12 BRPM | DIASTOLIC BLOOD PRESSURE: 84 MMHG | HEART RATE: 64 BPM | TEMPERATURE: 97.4 F | HEIGHT: 66 IN | SYSTOLIC BLOOD PRESSURE: 135 MMHG | WEIGHT: 140 LBS | OXYGEN SATURATION: 96 % | BODY MASS INDEX: 22.5 KG/M2

## 2019-08-22 DIAGNOSIS — J34.2 NASAL SEPTAL DEVIATION: Primary | ICD-10-CM

## 2019-08-22 PROCEDURE — 76210000021 HC REC RM PH II 0.5 TO 1 HR: Performed by: OTOLARYNGOLOGY

## 2019-08-22 PROCEDURE — 74011000250 HC RX REV CODE- 250: Performed by: OTOLARYNGOLOGY

## 2019-08-22 PROCEDURE — 76210000017 HC OR PH I REC 1.5 TO 2 HR: Performed by: OTOLARYNGOLOGY

## 2019-08-22 PROCEDURE — 77030011645 HC PK NSL DOYLE MEDT -B: Performed by: OTOLARYNGOLOGY

## 2019-08-22 PROCEDURE — 77030040361 HC SLV COMPR DVT MDII -B: Performed by: OTOLARYNGOLOGY

## 2019-08-22 PROCEDURE — 77030006907 HC BLD SNUS SHV MEDT -C: Performed by: OTOLARYNGOLOGY

## 2019-08-22 PROCEDURE — 76060000033 HC ANESTHESIA 1 TO 1.5 HR: Performed by: OTOLARYNGOLOGY

## 2019-08-22 PROCEDURE — 74011250636 HC RX REV CODE- 250/636: Performed by: NURSE ANESTHETIST, CERTIFIED REGISTERED

## 2019-08-22 PROCEDURE — 74011250636 HC RX REV CODE- 250/636: Performed by: OTOLARYNGOLOGY

## 2019-08-22 PROCEDURE — 77030008684 HC TU ET CUF COVD -B: Performed by: NURSE ANESTHETIST, CERTIFIED REGISTERED

## 2019-08-22 PROCEDURE — 74011250636 HC RX REV CODE- 250/636: Performed by: ANESTHESIOLOGY

## 2019-08-22 PROCEDURE — 77030002974 HC SUT PLN J&J -A: Performed by: OTOLARYNGOLOGY

## 2019-08-22 PROCEDURE — 77030002888 HC SUT CHRMC J&J -A: Performed by: OTOLARYNGOLOGY

## 2019-08-22 PROCEDURE — 77030026438 HC STYL ET INTUB CARD -A: Performed by: NURSE ANESTHETIST, CERTIFIED REGISTERED

## 2019-08-22 PROCEDURE — 77030018836 HC SOL IRR NACL ICUM -A: Performed by: OTOLARYNGOLOGY

## 2019-08-22 PROCEDURE — 74011000250 HC RX REV CODE- 250: Performed by: NURSE ANESTHETIST, CERTIFIED REGISTERED

## 2019-08-22 PROCEDURE — 76010000149 HC OR TIME 1 TO 1.5 HR: Performed by: OTOLARYNGOLOGY

## 2019-08-22 PROCEDURE — 74011250637 HC RX REV CODE- 250/637: Performed by: ANESTHESIOLOGY

## 2019-08-22 RX ORDER — ONDANSETRON 2 MG/ML
INJECTION INTRAMUSCULAR; INTRAVENOUS AS NEEDED
Status: DISCONTINUED | OUTPATIENT
Start: 2019-08-22 | End: 2019-08-22 | Stop reason: HOSPADM

## 2019-08-22 RX ORDER — OXYCODONE AND ACETAMINOPHEN 5; 325 MG/1; MG/1
1 TABLET ORAL
Qty: 30 TAB | Refills: 0 | Status: SHIPPED | OUTPATIENT
Start: 2019-08-22 | End: 2019-08-29

## 2019-08-22 RX ORDER — BACITRACIN 500 [USP'U]/G
OINTMENT TOPICAL AS NEEDED
Status: DISCONTINUED | OUTPATIENT
Start: 2019-08-22 | End: 2019-08-22 | Stop reason: HOSPADM

## 2019-08-22 RX ORDER — SODIUM CHLORIDE, SODIUM LACTATE, POTASSIUM CHLORIDE, CALCIUM CHLORIDE 600; 310; 30; 20 MG/100ML; MG/100ML; MG/100ML; MG/100ML
125 INJECTION, SOLUTION INTRAVENOUS CONTINUOUS
Status: DISCONTINUED | OUTPATIENT
Start: 2019-08-22 | End: 2019-08-22 | Stop reason: HOSPADM

## 2019-08-22 RX ORDER — LIDOCAINE HYDROCHLORIDE 20 MG/ML
INJECTION, SOLUTION EPIDURAL; INFILTRATION; INTRACAUDAL; PERINEURAL AS NEEDED
Status: DISCONTINUED | OUTPATIENT
Start: 2019-08-22 | End: 2019-08-22 | Stop reason: HOSPADM

## 2019-08-22 RX ORDER — LIDOCAINE HYDROCHLORIDE AND EPINEPHRINE 10; 10 MG/ML; UG/ML
INJECTION, SOLUTION INFILTRATION; PERINEURAL AS NEEDED
Status: DISCONTINUED | OUTPATIENT
Start: 2019-08-22 | End: 2019-08-22 | Stop reason: HOSPADM

## 2019-08-22 RX ORDER — FENTANYL CITRATE 50 UG/ML
INJECTION, SOLUTION INTRAMUSCULAR; INTRAVENOUS AS NEEDED
Status: DISCONTINUED | OUTPATIENT
Start: 2019-08-22 | End: 2019-08-22 | Stop reason: HOSPADM

## 2019-08-22 RX ORDER — SODIUM CHLORIDE 0.9 % (FLUSH) 0.9 %
5-40 SYRINGE (ML) INJECTION AS NEEDED
Status: DISCONTINUED | OUTPATIENT
Start: 2019-08-22 | End: 2019-08-22 | Stop reason: HOSPADM

## 2019-08-22 RX ORDER — CEPHALEXIN 500 MG/1
500 CAPSULE ORAL 3 TIMES DAILY
Qty: 21 CAP | Refills: 0 | Status: SHIPPED | OUTPATIENT
Start: 2019-08-22 | End: 2019-08-29

## 2019-08-22 RX ORDER — PHENYLEPHRINE HCL IN 0.9% NACL 0.4MG/10ML
SYRINGE (ML) INTRAVENOUS AS NEEDED
Status: DISCONTINUED | OUTPATIENT
Start: 2019-08-22 | End: 2019-08-22 | Stop reason: HOSPADM

## 2019-08-22 RX ORDER — SUCCINYLCHOLINE CHLORIDE 20 MG/ML
INJECTION INTRAMUSCULAR; INTRAVENOUS AS NEEDED
Status: DISCONTINUED | OUTPATIENT
Start: 2019-08-22 | End: 2019-08-22 | Stop reason: HOSPADM

## 2019-08-22 RX ORDER — OXYCODONE AND ACETAMINOPHEN 5; 325 MG/1; MG/1
1 TABLET ORAL AS NEEDED
Status: DISCONTINUED | OUTPATIENT
Start: 2019-08-22 | End: 2019-08-22 | Stop reason: HOSPADM

## 2019-08-22 RX ORDER — CEFAZOLIN SODIUM/WATER 2 G/20 ML
2 SYRINGE (ML) INTRAVENOUS ONCE
Status: COMPLETED | OUTPATIENT
Start: 2019-08-22 | End: 2019-08-22

## 2019-08-22 RX ORDER — SODIUM CHLORIDE 0.9 % (FLUSH) 0.9 %
5-40 SYRINGE (ML) INJECTION EVERY 8 HOURS
Status: DISCONTINUED | OUTPATIENT
Start: 2019-08-22 | End: 2019-08-22 | Stop reason: HOSPADM

## 2019-08-22 RX ORDER — MIDAZOLAM HYDROCHLORIDE 1 MG/ML
INJECTION, SOLUTION INTRAMUSCULAR; INTRAVENOUS AS NEEDED
Status: DISCONTINUED | OUTPATIENT
Start: 2019-08-22 | End: 2019-08-22 | Stop reason: HOSPADM

## 2019-08-22 RX ORDER — PROPOFOL 10 MG/ML
INJECTION, EMULSION INTRAVENOUS AS NEEDED
Status: DISCONTINUED | OUTPATIENT
Start: 2019-08-22 | End: 2019-08-22 | Stop reason: HOSPADM

## 2019-08-22 RX ORDER — MORPHINE SULFATE 10 MG/ML
2 INJECTION, SOLUTION INTRAMUSCULAR; INTRAVENOUS
Status: DISCONTINUED | OUTPATIENT
Start: 2019-08-22 | End: 2019-08-22 | Stop reason: HOSPADM

## 2019-08-22 RX ORDER — SODIUM CHLORIDE, SODIUM LACTATE, POTASSIUM CHLORIDE, CALCIUM CHLORIDE 600; 310; 30; 20 MG/100ML; MG/100ML; MG/100ML; MG/100ML
INJECTION, SOLUTION INTRAVENOUS
Status: DISCONTINUED | OUTPATIENT
Start: 2019-08-22 | End: 2019-08-22 | Stop reason: HOSPADM

## 2019-08-22 RX ORDER — DIPHENHYDRAMINE HYDROCHLORIDE 50 MG/ML
12.5 INJECTION, SOLUTION INTRAMUSCULAR; INTRAVENOUS AS NEEDED
Status: DISCONTINUED | OUTPATIENT
Start: 2019-08-22 | End: 2019-08-22 | Stop reason: HOSPADM

## 2019-08-22 RX ORDER — LIDOCAINE HYDROCHLORIDE 10 MG/ML
0.1 INJECTION, SOLUTION EPIDURAL; INFILTRATION; INTRACAUDAL; PERINEURAL AS NEEDED
Status: DISCONTINUED | OUTPATIENT
Start: 2019-08-22 | End: 2019-08-22 | Stop reason: HOSPADM

## 2019-08-22 RX ORDER — OXYMETAZOLINE HCL 0.05 %
SPRAY, NON-AEROSOL (ML) NASAL AS NEEDED
Status: DISCONTINUED | OUTPATIENT
Start: 2019-08-22 | End: 2019-08-22 | Stop reason: HOSPADM

## 2019-08-22 RX ORDER — MIDAZOLAM HYDROCHLORIDE 1 MG/ML
0.5 INJECTION, SOLUTION INTRAMUSCULAR; INTRAVENOUS
Status: DISCONTINUED | OUTPATIENT
Start: 2019-08-22 | End: 2019-08-22 | Stop reason: HOSPADM

## 2019-08-22 RX ORDER — FENTANYL CITRATE 50 UG/ML
50 INJECTION, SOLUTION INTRAMUSCULAR; INTRAVENOUS AS NEEDED
Status: DISCONTINUED | OUTPATIENT
Start: 2019-08-22 | End: 2019-08-22 | Stop reason: HOSPADM

## 2019-08-22 RX ORDER — DEXMEDETOMIDINE HYDROCHLORIDE 100 UG/ML
INJECTION, SOLUTION INTRAVENOUS AS NEEDED
Status: DISCONTINUED | OUTPATIENT
Start: 2019-08-22 | End: 2019-08-22 | Stop reason: HOSPADM

## 2019-08-22 RX ORDER — ACETAMINOPHEN 325 MG/1
650 TABLET ORAL ONCE
Status: COMPLETED | OUTPATIENT
Start: 2019-08-22 | End: 2019-08-22

## 2019-08-22 RX ORDER — DEXAMETHASONE SODIUM PHOSPHATE 10 MG/ML
10 INJECTION INTRAMUSCULAR; INTRAVENOUS ONCE
Status: DISCONTINUED | OUTPATIENT
Start: 2019-08-22 | End: 2019-08-22 | Stop reason: HOSPADM

## 2019-08-22 RX ORDER — MIDAZOLAM HYDROCHLORIDE 1 MG/ML
1 INJECTION, SOLUTION INTRAMUSCULAR; INTRAVENOUS AS NEEDED
Status: DISCONTINUED | OUTPATIENT
Start: 2019-08-22 | End: 2019-08-22 | Stop reason: HOSPADM

## 2019-08-22 RX ORDER — HYDROMORPHONE HYDROCHLORIDE 1 MG/ML
0.2 INJECTION, SOLUTION INTRAMUSCULAR; INTRAVENOUS; SUBCUTANEOUS
Status: DISCONTINUED | OUTPATIENT
Start: 2019-08-22 | End: 2019-08-22 | Stop reason: HOSPADM

## 2019-08-22 RX ORDER — ROCURONIUM BROMIDE 10 MG/ML
INJECTION, SOLUTION INTRAVENOUS AS NEEDED
Status: DISCONTINUED | OUTPATIENT
Start: 2019-08-22 | End: 2019-08-22 | Stop reason: HOSPADM

## 2019-08-22 RX ORDER — FENTANYL CITRATE 50 UG/ML
25 INJECTION, SOLUTION INTRAMUSCULAR; INTRAVENOUS
Status: DISCONTINUED | OUTPATIENT
Start: 2019-08-22 | End: 2019-08-22 | Stop reason: HOSPADM

## 2019-08-22 RX ORDER — DEXAMETHASONE SODIUM PHOSPHATE 4 MG/ML
INJECTION, SOLUTION INTRA-ARTICULAR; INTRALESIONAL; INTRAMUSCULAR; INTRAVENOUS; SOFT TISSUE AS NEEDED
Status: DISCONTINUED | OUTPATIENT
Start: 2019-08-22 | End: 2019-08-22 | Stop reason: HOSPADM

## 2019-08-22 RX ORDER — ONDANSETRON 2 MG/ML
4 INJECTION INTRAMUSCULAR; INTRAVENOUS AS NEEDED
Status: DISCONTINUED | OUTPATIENT
Start: 2019-08-22 | End: 2019-08-22 | Stop reason: HOSPADM

## 2019-08-22 RX ORDER — SODIUM CHLORIDE 9 MG/ML
25 INJECTION, SOLUTION INTRAVENOUS CONTINUOUS
Status: DISCONTINUED | OUTPATIENT
Start: 2019-08-22 | End: 2019-08-22 | Stop reason: HOSPADM

## 2019-08-22 RX ORDER — ONDANSETRON 8 MG/1
8 TABLET, ORALLY DISINTEGRATING ORAL
Qty: 5 TAB | Refills: 1 | Status: SHIPPED | OUTPATIENT
Start: 2019-08-22 | End: 2020-11-03 | Stop reason: ALTCHOICE

## 2019-08-22 RX ORDER — DEXTROSE, SODIUM CHLORIDE, SODIUM LACTATE, POTASSIUM CHLORIDE, AND CALCIUM CHLORIDE 5; .6; .31; .03; .02 G/100ML; G/100ML; G/100ML; G/100ML; G/100ML
100 INJECTION, SOLUTION INTRAVENOUS CONTINUOUS
Status: DISCONTINUED | OUTPATIENT
Start: 2019-08-22 | End: 2019-08-22 | Stop reason: HOSPADM

## 2019-08-22 RX ADMIN — LIDOCAINE HYDROCHLORIDE 60 MG: 20 INJECTION, SOLUTION EPIDURAL; INFILTRATION; INTRACAUDAL; PERINEURAL at 13:56

## 2019-08-22 RX ADMIN — Medication 80 MCG: at 14:18

## 2019-08-22 RX ADMIN — OXYCODONE AND ACETAMINOPHEN 1 TABLET: 5; 325 TABLET ORAL at 16:17

## 2019-08-22 RX ADMIN — Medication 2 G: at 14:10

## 2019-08-22 RX ADMIN — PHENYLEPHRINE HYDROCHLORIDE 30 MCG/MIN: 10 INJECTION INTRAVENOUS at 14:32

## 2019-08-22 RX ADMIN — DEXMEDETOMIDINE HYDROCHLORIDE 10 MCG: 100 INJECTION, SOLUTION, CONCENTRATE INTRAVENOUS at 13:49

## 2019-08-22 RX ADMIN — FENTANYL CITRATE 25 MCG: 50 INJECTION INTRAMUSCULAR; INTRAVENOUS at 15:50

## 2019-08-22 RX ADMIN — Medication 80 MCG: at 14:13

## 2019-08-22 RX ADMIN — Medication 80 MCG: at 14:31

## 2019-08-22 RX ADMIN — Medication 80 MCG: at 14:25

## 2019-08-22 RX ADMIN — MIDAZOLAM 2 MG: 1 INJECTION INTRAMUSCULAR; INTRAVENOUS at 13:49

## 2019-08-22 RX ADMIN — Medication 2 SPRAY: at 13:49

## 2019-08-22 RX ADMIN — ROCURONIUM BROMIDE 5 MG: 10 SOLUTION INTRAVENOUS at 13:56

## 2019-08-22 RX ADMIN — ONDANSETRON HYDROCHLORIDE 4 MG: 2 INJECTION, SOLUTION INTRAMUSCULAR; INTRAVENOUS at 15:50

## 2019-08-22 RX ADMIN — DEXAMETHASONE SODIUM PHOSPHATE 10 MG: 4 INJECTION, SOLUTION INTRAMUSCULAR; INTRAVENOUS at 14:08

## 2019-08-22 RX ADMIN — FENTANYL CITRATE 25 MCG: 50 INJECTION INTRAMUSCULAR; INTRAVENOUS at 15:55

## 2019-08-22 RX ADMIN — FENTANYL CITRATE 50 MCG: 50 INJECTION, SOLUTION INTRAMUSCULAR; INTRAVENOUS at 13:56

## 2019-08-22 RX ADMIN — PROPOFOL 20 MG: 10 INJECTION, EMULSION INTRAVENOUS at 13:57

## 2019-08-22 RX ADMIN — ONDANSETRON HYDROCHLORIDE 4 MG: 2 INJECTION, SOLUTION INTRAMUSCULAR; INTRAVENOUS at 14:52

## 2019-08-22 RX ADMIN — SUCCINYLCHOLINE CHLORIDE 160 MG: 20 INJECTION, SOLUTION INTRAMUSCULAR; INTRAVENOUS at 13:57

## 2019-08-22 RX ADMIN — SODIUM CHLORIDE, POTASSIUM CHLORIDE, SODIUM LACTATE AND CALCIUM CHLORIDE: 600; 310; 30; 20 INJECTION, SOLUTION INTRAVENOUS at 12:00

## 2019-08-22 RX ADMIN — FENTANYL CITRATE 50 MCG: 50 INJECTION, SOLUTION INTRAMUSCULAR; INTRAVENOUS at 14:19

## 2019-08-22 RX ADMIN — ACETAMINOPHEN 650 MG: 325 TABLET, FILM COATED ORAL at 12:34

## 2019-08-22 RX ADMIN — PROPOFOL 100 MG: 10 INJECTION, EMULSION INTRAVENOUS at 13:56

## 2019-08-22 NOTE — DISCHARGE INSTRUCTIONS
Patient Education         YOU HAD ONE PERCOCET IN THE RECOVERY ROOM AT 4:15PM     Nasal Septum Repair: What to Expect at [address] may have some swelling of your nose, upper lip, cheeks, or around your eyes after nasal surgery. You may have some bruises around your nose and eyes. Your nose may be sore and will bleed. This may last for several days after surgery. The tip of your nose and your upper lip and gums may be numb. Feeling will return in a few weeks to a few months. Your sense of smell may not be as good after surgery. But it will improve and will often return to normal in 1 to 2 months. You will have a drip pad under your nose to collect mucus and blood. Change it only when it bleeds through. You may have to do this every hour for 24 hours after surgery. You will probably be able to return to work or school in a few days and to your normal routine in about 3 weeks. But this varies with your job and how much surgery you had. Most people recover fully in 1 to 2 months. You will have to visit your doctor during the 3 to 4 months after your surgery. Your doctor will check to see that your nose is healing well. This care sheet gives you a general idea about how long it will take for you to recover. But each person recovers at a different pace. Follow the steps below to get better as quickly as possible. How can you care for yourself at home? Activity    · Rest when you feel tired. Getting enough sleep will help you recover. Do not lie flat. Raise your head with two or three pillows. This can reduce swelling. Try to sleep on your back for the month after surgery. You can also sleep in a reclining chair.     · Try to walk each day. Start by walking a little more than you did the day before. Bit by bit, increase the amount you walk. Walking boosts blood flow and helps prevent pneumonia and constipation.  Also, try to sit and stand as much as you can.     · For 1 week, try not to bend over or lift anything heavier than 10 pounds. This may include a child, heavy grocery bags and milk containers, a heavy briefcase or backpack, cat litter or dog food bags, or a vacuum .     · You can take a shower or bath. Avoid swimming for 6 weeks.     · Avoid strenuous activities, such as bicycle riding, jogging, weight lifting, or aerobic exercise, for 1 week or until your doctor says it is okay.     · You may drive when you are no longer taking prescription pain pills and feel up to it. Diet    · You can eat your normal diet. If your stomach is upset, try bland, low-fat foods like plain rice, broiled chicken, toast, and yogurt.     · You may notice that your bowel movements are not regular right after your surgery. This is common. Try to avoid constipation and straining with bowel movements. You may want to take a fiber supplement every day. If you have not had a bowel movement after a couple of days, ask your doctor about taking a mild laxative. Medicines    · Your doctor will tell you if and when you can restart your medicines. He or she will also give you instructions about taking any new medicines.     · If you take blood thinners, such as warfarin (Coumadin), clopidogrel (Plavix), or aspirin, be sure to talk to your doctor. He or she will tell you if and when to start taking those medicines again. Make sure that you understand exactly what your doctor wants you to do.     · Do not take aspirin, aspirin-containing medicines, or anti-inflammatory medicines such as ibuprofen (Advil, Motrin) or naproxen (Aleve) for 3 weeks following surgery unless your doctor says it is okay.     · Take pain medicines exactly as directed. ? If the doctor gave you a prescription medicine for pain, take it as prescribed. ? Do not take two or more pain medicines at the same time unless the doctor told you to. Many pain medicines have acetaminophen, which is Tylenol.  Too much acetaminophen (Tylenol) can be harmful.     · If your doctor prescribed antibiotics, take them as directed. Do not stop taking them just because you feel better. You need to take the full course of antibiotics.     · If you think your pain medicine is making you sick to your stomach:  ? Take your medicine after meals (unless your doctor has told you not to). ? Ask your doctor for a different pain medicine. Incision care    · You will have a drip pad under your nose to collect blood. Change it only when it has bled through. You may have to do this every hour for 24 hours after surgery. When bleeding stops, you can remove it.     · If you have packing in your nose, leave it in. Your doctor will take it out. Ice and elevation    · To help with swelling and pain, put ice or a cold pack on your nose for 10 to 20 minutes at a time. Put a thin cloth between the ice and your skin.     · Sleep with your head raised up. You can also sleep in a reclining chair. Other instructions    · Do not blow your nose for 1 week after surgery.     · Do not put anything into your nose.     · If you must sneeze, open your mouth and sneeze naturally.     · After any packing is removed, use saline (saltwater) nasal washes to help keep your nasal passages open and wash out mucus and bacteria. You can buy saline nose drops at a grocery store or drugstore. Or you can make your own at home by adding 1 teaspoon of salt and 1 teaspoon of baking soda to 2 cups of distilled water. If you make your own, fill a bulb syringe with the solution, insert the tip into your nostril, and squeeze gently. Maria Luz Escobar your nose.     · You can wear your glasses when you wish. Do not wear contacts until the day after the surgery. Follow-up care is a key part of your treatment and safety. Be sure to make and go to all appointments, and call your doctor if you are having problems. It's also a good idea to know your test results and keep a list of the medicines you take. When should you call for help?   Call [phone] anytime you think you may need emergency care. For example, call if:    · You passed out (lost consciousness).     · You have severe trouble breathing.    Call your doctor now or seek immediate medical care if:    · You have bleeding through the nasal packing that is not slowing.     · You have symptoms of infection, such as:  ? Increased pain, swelling, warmth, or redness. ? Red streaks coming from the area. ? Pus draining from the area. ? A fever.     · You have pain that does not get better after you take pain pills.    Watch closely for any changes in your health, and be sure to contact your doctor if:    · You do not get better as expected. Where can you learn more? Go to http://maninder-miko.info/. Enter P838 in the search box to learn more about \"Nasal Septum Repair: What to Expect at Home. \"  Current as of: October 21, 2018  Content Version: 12.1  © 6254-7042 [employer]. Care instructions adapted under license by [employer] (which disclaims liability or warranty for this information). If you have questions about a medical condition or this instruction, always ask your healthcare professional. [place] [address] any warranty or liability for your use of this information.        Instructions Following Ambulatory Surgery    Activity  · As tolerated and directed by your doctor  · Bathe or shower as directed by your doctor    Diet  · Clear liquids until no nausea or vomiting; then light diet for the first day  · Advance to regular diet on second day, unless your doctor orders otherwise  · If nausea and vomiting continues, call your doctor    Pain  · Take pain medication as directed by your doctor  ·  Call your doctor if pain is NOT relieved by medication  · DO NOT take aspirin or blood thinners until directed by your doctor        Follow-Up Phone Calls  · Will be made nursing staff  · If you have any problems, call your doctor as needed    Call your doctor if  · Excessive bleeding that does not stop after holding mild pressure over the area  · Temperature of 101 degrees F or above  · Redness,excessive swelling or bruising, and/or green or yellow, smelly discharge from incision    After Anesthesia  · For the first 24 hours: DO NOT Drive, Drink alcoholic beverages, or Make important decisions  · Be aware of dizziness following anesthesia and while taking pain medication

## 2019-08-22 NOTE — ROUTINE PROCESS
Patient: Victorino Francis MRN: [id number]  SSN: [id number]   YOB: 1950  Age: 70 y.o. Sex: female     Patient is status post Procedure(s):  SEPTOPLASTY, RESECTION OF TURBINATE BONES, REPAIR OF NASAL VESTIBULAR STENOSIS, CARTILAGE GRAFT.     Surgeon(s) and Role:     * Shubham Barker MD - Primary    Local/Dose/Irrigation:  10mL 1% Lidocaine with Epinephrine 1:100,000                  Peripheral IV 08/22/19 Left;Posterior Hand (Active)   Site Assessment Clean, dry, & intact 8/22/2019 12:55 PM   Phlebitis Assessment 0 8/22/2019 12:55 PM   Dressing Status Clean, dry, & intact 8/22/2019 12:55 PM   Hub Color/Line Status Infusing;Green 8/22/2019 12:55 PM            Airway - Endotracheal Tube 08/22/19 Oral (Active)                   Dressing/Packing:  Wound Nose-Dressing Type: 4 x 4;Special tape (comment) (08/22/19 1500)    Splint/Cast: Wound Nose-Splint Type/Material: Nasal open lumen splint]    Other:

## 2019-08-22 NOTE — ANESTHESIA POSTPROCEDURE EVALUATION
Post-Anesthesia Evaluation and Assessment    Patient: Brady Arzola MRN: [id number]  SSN: [id number]    YOB: 1950  Age: 70 y.o. Sex: female      I have evaluated the patient and they are stable and ready for discharge from the PACU. Cardiovascular Function/Vital Signs  Visit Vitals  /83   Pulse 65   Temp 36.3 °C (97.4 °F)   Resp 17   Ht 5' 6\" (1.676 m)   Wt 63.5 kg (140 lb)   SpO2 99%   BMI 22.60 kg/m²       Patient is status post General anesthesia for Procedure(s):  SEPTOPLASTY, RESECTION OF TURBINATE BONES, REPAIR OF NASAL VESTIBULAR STENOSIS, CARTILAGE GRAFT. Nausea/Vomiting: None    Postoperative hydration reviewed and adequate. Pain:  Pain Scale 1: (P) Numeric (0 - 10) (08/22/19 1550)  Pain Intensity 1: (P) 5 (08/22/19 1550)   Managed    Neurological Status:   Neuro (WDL): Within Defined Limits (08/22/19 1512)  Neuro  LUE Motor Response: Purposeful (08/22/19 1512)  LLE Motor Response: Purposeful (08/22/19 1512)  RUE Motor Response: Purposeful (08/22/19 1512)  RLE Motor Response: Purposeful (08/22/19 1512)   At baseline    Mental Status, Level of Consciousness: Alert and  oriented to person, place, and time    Pulmonary Status:   O2 Device: Room air (08/22/19 1512)   Adequate oxygenation and airway patent    Complications related to anesthesia: None    Post-anesthesia assessment completed. No concerns    Signed By: Helen Samson MD     August 22, 2019              Procedure(s):  SEPTOPLASTY, RESECTION OF TURBINATE BONES, REPAIR OF NASAL VESTIBULAR STENOSIS, CARTILAGE GRAFT. general    <BSHSIANPOST>    Vitals Value Taken Time   /83 8/22/2019  3:45 PM   Temp 36.3 °C (97.4 °F) 8/22/2019  3:12 PM   Pulse 59 8/22/2019  4:01 PM   Resp 16 8/22/2019  4:01 PM   SpO2 98 % 8/22/2019  4:01 PM   Vitals shown include unvalidated device data.

## 2019-09-16 NOTE — OP NOTES
OPERATIVE REPORT    NAME: Yarelis Arriaga  MRN: [id number]  DATE: 9/15/2019        PREOPERATIVE DIAGNOSIS: Septal deviation, nasal valve collapse/nasal  vestibular stenosis, traumatic nasal defect with saddle nose deformity and inferior turbinate hypertrophy. POSTOPERATIVE DIAGNOSIS: Septal deviation, nasal valve collapse/nasal  vestibular stenosis, traumatic nasal defect with saddle nose deformity and inferior turbinate hypertrophy. PROCEDURES  1. Bilateral nasal vestibular stenosis repair/Nasoseptal reconstruction. 2. Bilateral inferior turbinate reduction. SURGEON: Nunu Mccord MD    ANESTHESIA: General endotracheal tube anesthesia. PREOPERATIVE MEDICATIONS: .    COMPLICATIONS: None. No implant    No assisstant    ESTIMATED BLOOD LOSS: 30 mL    INDICATIONS AND FINDINGS: The patient has a history of multiple nasal traumas with  bilateral upper lateral cartilage dislocation from the caudal nasal bones,  causing flail side walls with mild and moderate inspiration, causing a  dynamic collapse, compounded by the fixed obstruction of the marked septal  deviation, buckled and cup-shaped, with a severe spur,  touching the sidewall of the nose, the caudal septum being deflected  off the nasal spine with marked compensatory turbinate  Hypertrophy. functional airway being impossible to attain optimally without this component of the procedure, non-aesthetic in nature, and hence, indications. DETAILS OF THE PROCEDURE: In the operating room, the patient was induced  under general endotracheal tube anesthesia, following which he was prepped  and draped in a sterile fashion. Then, 1% lidocaine 1:100,000 part epinephrine was used for local  infiltration at all sites and infraorbital foramen blocks.  Bilateral  intercartilaginous incisions were made, elevating a subnasal SMAS envelope  over the upper lateral cartilages and across the nasal dorsum, using a  Joanette Aaron to elevate the subperiosteum over the caudal nasal bones. A left-sided Carlitos incision was made, elevating bilateral  submucoperichondrial leaflets, dissecting the concavity of the spur on the  right, incising the junction of the spur with the septum and peeling this  off the crest itself. Once accomplished, the leaflets could be peeled from  the sides of the crest, into the floor of the nose on both sides. The bony  cartilaginous junction was divided, and the deflected perpendicular plate  of the ethmoid incised high and low with Fomon scissors, removing this  deflected plate with duckbill forceps. The subluxed portion of septum off  the crest, bowing this because of excess height, was then resected, for a  jigsaw puzzle fit. An L-strut of greater than 1.5 cm was left, using the  remaining posterior septum as straight sheets for the valve grafts. This  was resected. This cartilage was then shaped and beveled and morselized to  remove its memory and used to match the shape of the upper lateral shape  cartilages on both sides for later use. The septum was resected from the  nasal spine in part of the shortening to make it appropriate height,  sitting in the midline in an open hinged fashion. Straight plates of septal  cartilage and bone were then repositioned in the anatomic place and a  mattress suture used in a figure-of-eight fashion to fix the septum to the  midline of the crest and close the dead space of the leaflets. Overlay  evelyne-type grafts were then used from the septal cartilage, using straight plates of structurally redundant cartilage, thinning and bevelling and matching the shape of the ULCs. These were then placed overlying each upper lateral cartilage over the piriform aperture with overlap on the caudal bony edge  and mattressed to the upper lateral cartilages on both sides, anteriorly  tucked into the scroll region. Intercartilaginous incisions were then closed  with interrupted 5-0 plain gut sutures.     Bilateral intercartilagenous incisions were made in the limen vestibule with a 15 blade, dividing the scroll area and dissecting the nasal envelope in a sub nasal SMAS plane, converting to subperiosteal plane from the rhinion using a Kevin elevator to elevate the central one centimeter of the nasal dorsum to the nasion. The upper lateral cartilages were  from the dorsal septum with a [place] from the endonasal approach, making medial osteotomies continuous with this plane across the keystone area, flaring into the nasion. Minimal rasping was then performed to emy away sharp edges from the osteotomies without changing the character of the nasal dorsum, non-aesthetic in nature. Lateral osteotomies were then performed through stab incisions at the base of the inferior turbinates in a high-low-high fashion, mobilizing the nasal pyramid and allowing centralization of the nasal superstructure. Because of the saddle deformity pinching the nasal valve, a horizontal  was placed in the supratip area, bridging and bracing the internal valve areas and fixed with a percutaneous mattress to the nasal dorsal skin with a 5-0 suture. Stab incisions made at the base of each inferior turbinate and submucosal  pocket were created medially and inferiorly, the length of each inferior  turbinate. On the right there was marked compensatory turbinate  hypertrophy. The turbinate blade of the microdebrider was then used to core  out the parenchyma of each inferior turbinate, preserving mucosa maximally. The bone was nibbled with some of this, but further addressed,  outfracturing each inferior turbinate with the Egan butter knife, to open  up the valve at this level. The hypopharynx was suctioned. Bilateral Newton  splints were applied, secured to the membranous septum with a silk suture.   Mastisol and Steri-Strip cast was applied, and the patient then handed over  to Anesthesia once the throat pack was removed, for awakening and transfer  to the recovery room.       Rei Balbuena MD

## 2020-09-22 ENCOUNTER — OFFICE VISIT (OUTPATIENT)
Dept: FAMILY MEDICINE CLINIC | Age: 70
End: 2020-09-22
Payer: MEDICARE

## 2020-09-22 VITALS
RESPIRATION RATE: 20 BRPM | DIASTOLIC BLOOD PRESSURE: 81 MMHG | TEMPERATURE: 98.4 F | HEIGHT: 66 IN | WEIGHT: 142.4 LBS | OXYGEN SATURATION: 96 % | SYSTOLIC BLOOD PRESSURE: 130 MMHG | BODY MASS INDEX: 22.88 KG/M2 | HEART RATE: 67 BPM

## 2020-09-22 DIAGNOSIS — Z12.31 ENCOUNTER FOR SCREENING MAMMOGRAM FOR MALIGNANT NEOPLASM OF BREAST: ICD-10-CM

## 2020-09-22 DIAGNOSIS — E78.5 HYPERLIPIDEMIA, UNSPECIFIED HYPERLIPIDEMIA TYPE: Primary | ICD-10-CM

## 2020-09-22 DIAGNOSIS — Z23 ENCOUNTER FOR IMMUNIZATION: ICD-10-CM

## 2020-09-22 DIAGNOSIS — Z00.00 ENCOUNTER FOR MEDICARE ANNUAL WELLNESS EXAM: ICD-10-CM

## 2020-09-22 DIAGNOSIS — Z78.0 POSTMENOPAUSAL STATE: ICD-10-CM

## 2020-09-22 DIAGNOSIS — E55.9 VITAMIN D DEFICIENCY: ICD-10-CM

## 2020-09-22 DIAGNOSIS — Z13.820 OSTEOPOROSIS SCREENING: ICD-10-CM

## 2020-09-22 PROCEDURE — G9899 SCRN MAM PERF RSLTS DOC: HCPCS | Performed by: FAMILY MEDICINE

## 2020-09-22 PROCEDURE — G0463 HOSPITAL OUTPT CLINIC VISIT: HCPCS | Performed by: FAMILY MEDICINE

## 2020-09-22 PROCEDURE — G8420 CALC BMI NORM PARAMETERS: HCPCS | Performed by: FAMILY MEDICINE

## 2020-09-22 PROCEDURE — G0439 PPPS, SUBSEQ VISIT: HCPCS | Performed by: FAMILY MEDICINE

## 2020-09-22 PROCEDURE — G8400 PT W/DXA NO RESULTS DOC: HCPCS | Performed by: FAMILY MEDICINE

## 2020-09-22 PROCEDURE — G8510 SCR DEP NEG, NO PLAN REQD: HCPCS | Performed by: FAMILY MEDICINE

## 2020-09-22 PROCEDURE — 99214 OFFICE O/P EST MOD 30 MIN: CPT | Performed by: FAMILY MEDICINE

## 2020-09-22 PROCEDURE — G8427 DOCREV CUR MEDS BY ELIG CLIN: HCPCS | Performed by: FAMILY MEDICINE

## 2020-09-22 PROCEDURE — 1101F PT FALLS ASSESS-DOCD LE1/YR: CPT | Performed by: FAMILY MEDICINE

## 2020-09-22 PROCEDURE — 3017F COLORECTAL CA SCREEN DOC REV: CPT | Performed by: FAMILY MEDICINE

## 2020-09-22 PROCEDURE — 1090F PRES/ABSN URINE INCON ASSESS: CPT | Performed by: FAMILY MEDICINE

## 2020-09-22 PROCEDURE — G8536 NO DOC ELDER MAL SCRN: HCPCS | Performed by: FAMILY MEDICINE

## 2020-09-22 RX ORDER — ZOSTER VACCINE RECOMBINANT, ADJUVANTED 50 MCG/0.5
0.5 KIT INTRAMUSCULAR ONCE
Qty: 0.5 ML | Refills: 1 | Status: SHIPPED | OUTPATIENT
Start: 2020-09-22 | End: 2020-09-22

## 2020-09-22 NOTE — PROGRESS NOTES
HPI  Vero Guzman 78 y.o. female  presents to the office today for a CPE. Blood pressure 130/81, pulse 67, temperature 98.4 °F (36.9 °C), temperature source Oral, resp. rate 20, height 5' 6\" (1.676 m), weight 142 lb 6.4 oz (64.6 kg), SpO2 96 %. Body mass index is 22.98 kg/m². No chief complaint on file. CPE: A CPE was preforming during today's OV; all findings were normal.     Hyperlipidemia: Lipid panel on 4/13/2017 notable for total cholesterol 292, HDL 88, , and triglycerides 84. Pt presents today with updated labs; she states that she eats well and exercises. Her HDL and LDL are both high. I will scan in her results today. Vitamin D deficiency: Pt's vitamin D levels were 28.5 on 7/20/2018. Health maintenance: Pt states that she missed her last mammogram. She has not had a DEXA scan in the past few years either; I have placed orders for pt to receive a mammogram and a DEXA performed before her next OBGYN appointment. Pt is due for updated labwork; I have placed orders for pt to have labwork performed at a local [facility]. However, pt has presented me with a paper copy of her updated labs that were performed several weeks ago. I will scan these into her medical file. Pt states that she does not wish to have a flu vaccination performed. Provided pt with prescription and will follow up with local pharmacy for Shingrix vaccine. Medicare questionnaire discussed and responses reviewed today in office. Current Outpatient Medications   Medication Sig Dispense Refill    varicella-zoster recombinant, PF, (Shingrix, PF,) 50 mcg/0.5 mL susr injection 0.5 mL by IntraMUSCular route once for 1 dose. 0.5 mL 1    ASCORBATE CALCIUM (VITAMIN C PO) Take 1,000 mg by mouth daily.  CALCIUM PO Take 600 mg by mouth daily.  ondansetron (ZOFRAN ODT) 8 mg disintegrating tablet Take 1 Tab by mouth every eight (8) hours as needed for Nausea.  5 Tab 1     No Known Allergies  Past Medical History: Diagnosis Date    Adverse effect of anesthesia     HARD TO WAKE    Hyperlipidemia 2012     Past Surgical History:   Procedure Laterality Date    ENDOSCOPY, COLON, DIAGNOSTIC      polyp Follow up 2012; Dr. Malik Matson      X2    HX HEENT  9/10    cholesteatoma removal and ruptured ear drum    HX HEENT      NASAL REPAIR    HX OOPHORECTOMY  1980    HX TONSILLECTOMY       Family History   Problem Relation Age of Onset    Hypertension Mother     Cancer Maternal Aunt         breast    No Known Problems Sister     No Known Problems Sister     No Known Problems Daughter     No Known Problems Daughter     Heart Disease Maternal Grandmother         PACEMAKER    Anesth Problems Neg Hx     Asthma Neg Hx     Diabetes Neg Hx      Social History     Tobacco Use    Smoking status: Former Smoker     Packs/day: 0.25     Years: 21.00     Pack years: 5.25     Last attempt to quit: 1988     Years since quittin.7    Smokeless tobacco: Never Used   Substance Use Topics    Alcohol use: Yes     Frequency: 2-4 times a month     Comment: ocassionally/ once per week        Review of Systems   Constitutional: Negative for chills and fever. HENT: Negative for hearing loss and tinnitus. Eyes: Negative for blurred vision and double vision. Respiratory: Negative for cough and shortness of breath. Cardiovascular: Negative for chest pain and palpitations. Gastrointestinal: Negative for nausea and vomiting. Genitourinary: Negative for dysuria and frequency. Musculoskeletal: Negative for back pain and falls. Skin: Negative for itching and rash. Neurological: Negative for dizziness, loss of consciousness and headaches. Endo/Heme/Allergies: Negative. Psychiatric/Behavioral: Negative for depression. The patient is not nervous/anxious. Physical Exam  Constitutional:       Appearance: Normal appearance. HENT:      Head: Normocephalic and atraumatic.       Right Ear: Tympanic membrane, ear canal and external ear normal.      Left Ear: Tympanic membrane, ear canal and external ear normal.      Nose: Nose normal.      Mouth/Throat:      Mouth: Mucous membranes are moist.      Pharynx: Oropharynx is clear. Eyes:      Extraocular Movements: Extraocular movements intact. Conjunctiva/sclera: Conjunctivae normal.      Pupils: Pupils are equal, round, and reactive to light. Cardiovascular:      Rate and Rhythm: Normal rate and regular rhythm. Pulses: Normal pulses. Heart sounds: Normal heart sounds. Pulmonary:      Effort: Pulmonary effort is normal.      Breath sounds: Normal breath sounds. Abdominal:      General: Abdomen is flat. Bowel sounds are normal.      Palpations: Abdomen is soft. Genitourinary:     General: Normal vulva. Rectum: Normal.   Musculoskeletal: Normal range of motion. Skin:     General: Skin is warm and dry. Neurological:      General: No focal deficit present. Mental Status: She is alert and oriented to person, place, and time. Mental status is at baseline. Psychiatric:         Mood and Affect: Mood normal.         Behavior: Behavior normal.         Judgment: Judgment normal.           ASSESSMENT and PLAN  Diagnoses and all orders for this visit:    1. Hyperlipidemia, unspecified hyperlipidemia type  Lipid panel on 4/13/2017 notable for total cholesterol 292, HDL 88, , and triglycerides 84. Pt presents today with updated labs; she states that she eats well and exercises. Her HDL and LDL are both high. I will scan in her results today.   -     CT HEART W/O CONT WITH CALCIUM; Future    2. Vitamin D deficiency   Pt's vitamin D levels were 28.5 on 7/20/2018. 3. Postmenopausal state  t states that she missed her last mammogram. She has not had a DEXA scan in the past few years either; I have placed orders for pt to receive a mammogram and a DEXA performed before her next OBGYN appointment.   -     DEXA BONE DENSITY STUDY AXIAL; Future    4. Osteoporosis screening  Refer to #3.  -     [facility] MAMMO BI SCREENING INCL CAD; Future    5. Encounter for screening mammogram for malignant neoplasm of breast  Refer to #3.   -     [facility] MAMMO BI SCREENING INCL CAD; Future    6. Encounter for immunization  Provided pt with prescription and will follow up with local pharmacy for Shingrix vaccine. -     varicella-zoster recombinant, PF, (Shingrix, PF,) 50 mcg/0.5 mL susr injection; 0.5 mL by IntraMUSCular route once for 1 dose. Follow-up and Dispositions    · Return in about 1 year (around 9/22/2021) for physical.            Medication risks/benefits/costs/interactions/alternatives discussed with patient. Advised patient to call back or return to office if symptoms worsen/change/persist.  If patient cannot reach us or should anything more severe/urgent arise he/she should proceed directly to the nearest emergency department. Discussed expected course/resolution/complications of diagnosis in detail with patient. Patient given a written after visit summary which includes diagnoses, current medications and vitals. Patient expressed understanding with the diagnosis and plan. Written by dirk Amaral, as dictated by Jocelyne Casanova M.D.    4:02 PM - 4:28 PM    Total time spent with the patient 26 minutes, greater than 50% of time spent counseling patient.

## 2020-09-22 NOTE — PROGRESS NOTES
This is the Subsequent Medicare Annual Wellness Exam, performed 12 months or more after the Initial AWV or the last Subsequent AWV    I have reviewed the patient's medical history in detail and updated the computerized patient record. History     Patient Active Problem List   Diagnosis Code    Hyperlipidemia E78.5    ACP (advance care planning) Z71.89     Past Medical History:   Diagnosis Date    Adverse effect of anesthesia     HARD TO WAKE    Hyperlipidemia 2012      Past Surgical History:   Procedure Laterality Date    ENDOSCOPY, COLON, DIAGNOSTIC      polyp Follow up 2012; Dr. Amaury Agrawal HX HEENT  9/10    cholesteatoma removal and ruptured ear drum    HX HEENT      NASAL REPAIR    HX OOPHORECTOMY      HX TONSILLECTOMY       Current Outpatient Medications   Medication Sig Dispense Refill    varicella-zoster recombinant, PF, (Shingrix, PF,) 50 mcg/0.5 mL susr injection 0.5 mL by IntraMUSCular route once for 1 dose. 0.5 mL 1    ASCORBATE CALCIUM (VITAMIN C PO) Take 1,000 mg by mouth daily.  CALCIUM PO Take 600 mg by mouth daily.  ondansetron (ZOFRAN ODT) 8 mg disintegrating tablet Take 1 Tab by mouth every eight (8) hours as needed for Nausea.  5 Tab 1     No Known Allergies    Family History   Problem Relation Age of Onset    Hypertension Mother     Cancer Maternal Aunt         breast    No Known Problems Sister     No Known Problems Sister     No Known Problems Daughter     No Known Problems Daughter     Heart Disease Maternal Grandmother         PACEMAKER    Anesth Problems Neg Hx     Asthma Neg Hx     Diabetes Neg Hx      Social History     Tobacco Use    Smoking status: Former Smoker     Packs/day: 0.25     Years: 21.00     Pack years: 5.25     Last attempt to quit: 1988     Years since quittin.7    Smokeless tobacco: Never Used   Substance Use Topics    Alcohol use: Yes     Frequency: 2-4 times a month     Comment: ocassionally/ once per week       Depression Risk Factor Screening:     3 most recent PHQ Screens 9/22/2020   Little interest or pleasure in doing things Not at all   Feeling down, depressed, irritable, or hopeless Not at all   Total Score PHQ 2 0       Alcohol Risk Screen   Do you average more than 1 drink per night or more than 7 drinks a week:  No    On any one occasion in the past three months have you have had more than 3 drinks containing alcohol:  No        Functional Ability and Level of Safety:   Hearing: Hearing is good. Activities of Daily Living: The home contains: no safety equipment. Patient does total self care     Ambulation: with no difficulty     Fall Risk:  Fall Risk Assessment, last 12 mths 9/22/2020   Able to walk? Yes   Fall in past 12 months? No     Abuse Screen:  Patient is not abused       Cognitive Screening   Has your family/caregiver stated any concerns about your memory: no         Patient Care Team   Patient Care Team:  Soniya Morales MD as PCP - General (Family Medicine)  Soniya Morales MD as PCP - [facility] Empaneled Provider  Kajal Ghosh MD (Cardiology)  Babak Ayon MD (Otolaryngology)    Assessment/Plan   Education and counseling provided:  Are appropriate based on today's review and evaluation    Diagnoses and all orders for this visit:    1. Postmenopausal state  -     DEXA BONE DENSITY STUDY AXIAL; Future    2. Encounter for screening mammogram for malignant neoplasm of breast  -     MIAN MAMMO BI SCREENING INCL CAD; Future    3. Visit for screening mammogram    4. Osteoporosis screening  -     MIAN MAMMO BI SCREENING INCL CAD; Future    5. Postmenopausal  -     MIAN MAMMO BI SCREENING INCL CAD; Future    6. Encounter for immunization  -     varicella-zoster recombinant, PF, (Shingrix, PF,) 50 mcg/0.5 mL susr injection; 0.5 mL by IntraMUSCular route once for 1 dose. 7. Hyperlipidemia, unspecified hyperlipidemia type    8.  Vitamin D deficiency         Health Maintenance Due   Topic Date Due    Shingrix Vaccine Age 49> (1 of 2) 11/04/1999    Bone Densitometry (Dexa) Screening  11/04/2014    Breast Cancer Screen Mammogram  09/23/2017    Medicare Yearly Exam  06/29/2019

## 2020-10-01 ENCOUNTER — HOSPITAL ENCOUNTER (OUTPATIENT)
Dept: CT IMAGING | Age: 70
Discharge: HOME | End: 2020-10-01
Attending: FAMILY MEDICINE
Payer: SELF-PAY

## 2020-10-01 ENCOUNTER — HOSPITAL ENCOUNTER (OUTPATIENT)
Dept: MAMMOGRAPHY | Age: 70
Discharge: HOME | End: 2020-10-01
Attending: FAMILY MEDICINE

## 2020-10-01 DIAGNOSIS — Z12.31 ENCOUNTER FOR SCREENING MAMMOGRAM FOR MALIGNANT NEOPLASM OF BREAST: ICD-10-CM

## 2020-10-01 DIAGNOSIS — E78.5 HYPERLIPIDEMIA, UNSPECIFIED HYPERLIPIDEMIA TYPE: ICD-10-CM

## 2020-10-01 DIAGNOSIS — Z13.820 OSTEOPOROSIS SCREENING: ICD-10-CM

## 2020-10-01 PROCEDURE — 75571 CT HRT W/O DYE W/CA TEST: CPT

## 2020-10-01 NOTE — PROGRESS NOTES
Patient called & stated she will re-schedule DEXA  scan to a later date. She stated she had arrived and was already prepped with contrast 10/1/20 at [facility] for CT scan scheduled prior to her DEXA. IV contrast is a contraindication for the DEXA scan and CT should have been scheduled after the DEXA scan today.

## 2020-10-04 NOTE — PROGRESS NOTES
Ms. Maxwell Collet,    Your coronary calcium score showed that you have a 30 in your left anterior descending coronary artery datasets evidence for mild coronary artery disease. If the score is above 100 we may need to consider treatment. Also noted on your CT scan that you have a 4 mm pulmonary nodule in the right middle lobe according to [facility]'s guidelines since the nodule is less than 6 mm there is no routine follow-up needed. I would advise you we recheck this again with your coronary calcium score in about a year.     If you should have any questions please let me know

## 2020-10-04 NOTE — PROGRESS NOTES
Ms. Luis Jhoana,    Your coronary calcium score CT I noticed that the left anterior ascending coronary artery had a 30 by it. I feel that we need to recheck your coronary calcium test again in 1 year to make sure these numbers are not going up. If the numbers surpassed 100 that you may need to consider putting you on a statin. Also we noticed that you had a small 4 mm pulmonary nodule in the right middle lobe of your lung with regards to Fleischner's guidelines on nodule of less than or equal to 6 mm there are no routine follow-up needed but when we get your's coronary calcium score of your heart we will recheck and assess the nodule again. If you should have any questions please let me know.

## 2020-10-08 ENCOUNTER — HOSPITAL ENCOUNTER (OUTPATIENT)
Dept: MAMMOGRAPHY | Age: 70
Discharge: HOME | End: 2020-10-08
Attending: FAMILY MEDICINE
Payer: MEDICARE

## 2020-10-08 DIAGNOSIS — Z78.0 POSTMENOPAUSAL STATE: ICD-10-CM

## 2020-10-08 PROCEDURE — 77067 SCR MAMMO BI INCL CAD: CPT

## 2020-10-08 PROCEDURE — 77080 DXA BONE DENSITY AXIAL: CPT

## 2020-10-22 ENCOUNTER — TELEPHONE (OUTPATIENT)
Dept: FAMILY MEDICINE CLINIC | Age: 70
End: 2020-10-22

## 2020-10-22 NOTE — TELEPHONE ENCOUNTER
----- Message from Dave Eli MD sent at 10/18/2020  4:41 PM EDT -----  Pt has osteoporosis we need to discuss  Please set up a VV to discuss treatment options

## 2020-10-22 NOTE — TELEPHONE ENCOUNTER
ATR/UTR/ LMOVM     Calling to advise DR. Cabrera would like her to schedule appointment to discuss results. Visit may be VV or clinic appointment.

## 2020-11-03 ENCOUNTER — OFFICE VISIT (OUTPATIENT)
Dept: FAMILY MEDICINE CLINIC | Age: 70
End: 2020-11-03
Payer: MEDICARE

## 2020-11-03 VITALS
WEIGHT: 144 LBS | TEMPERATURE: 98.8 F | BODY MASS INDEX: 23.14 KG/M2 | DIASTOLIC BLOOD PRESSURE: 80 MMHG | HEIGHT: 66 IN | HEART RATE: 80 BPM | SYSTOLIC BLOOD PRESSURE: 123 MMHG | RESPIRATION RATE: 16 BRPM | OXYGEN SATURATION: 97 %

## 2020-11-03 DIAGNOSIS — M81.0 OSTEOPOROSIS, UNSPECIFIED OSTEOPOROSIS TYPE, UNSPECIFIED PATHOLOGICAL FRACTURE PRESENCE: ICD-10-CM

## 2020-11-03 DIAGNOSIS — R91.1 INCIDENTAL PULMONARY NODULE, > 3MM AND < 8MM: Primary | ICD-10-CM

## 2020-11-03 PROCEDURE — G8432 DEP SCR NOT DOC, RNG: HCPCS | Performed by: FAMILY MEDICINE

## 2020-11-03 PROCEDURE — 99213 OFFICE O/P EST LOW 20 MIN: CPT | Performed by: FAMILY MEDICINE

## 2020-11-03 PROCEDURE — 1090F PRES/ABSN URINE INCON ASSESS: CPT | Performed by: FAMILY MEDICINE

## 2020-11-03 PROCEDURE — G8536 NO DOC ELDER MAL SCRN: HCPCS | Performed by: FAMILY MEDICINE

## 2020-11-03 PROCEDURE — G8420 CALC BMI NORM PARAMETERS: HCPCS | Performed by: FAMILY MEDICINE

## 2020-11-03 PROCEDURE — 1101F PT FALLS ASSESS-DOCD LE1/YR: CPT | Performed by: FAMILY MEDICINE

## 2020-11-03 PROCEDURE — G0463 HOSPITAL OUTPT CLINIC VISIT: HCPCS | Performed by: FAMILY MEDICINE

## 2020-11-03 PROCEDURE — 3017F COLORECTAL CA SCREEN DOC REV: CPT | Performed by: FAMILY MEDICINE

## 2020-11-03 PROCEDURE — G8427 DOCREV CUR MEDS BY ELIG CLIN: HCPCS | Performed by: FAMILY MEDICINE

## 2020-11-03 PROCEDURE — G9899 SCRN MAM PERF RSLTS DOC: HCPCS | Performed by: FAMILY MEDICINE

## 2020-11-03 NOTE — PROGRESS NOTES
Chief Complaint   Patient presents with    Results     Bone Density     1. Have you been to the ER, urgent care clinic since your last visit? Hospitalized since your last visit?no    2. Have you seen or consulted any other health care providers outside of the [address] [place] [address] since your last visit? Include any pap smears or colon screening.  Bone Density, Mammogram, CT Heart

## 2020-11-03 NOTE — PROGRESS NOTES
HPI  Indy Mario 78 y.o. female  presents to the office today for lab results. Blood pressure 123/80, pulse 80, temperature 98.8 °F (37.1 °C), temperature source Temporal, resp. rate 16, height 5' 6\" (1.676 m), weight 144 lb (65.3 kg), SpO2 97 %. Body mass index is 23.24 kg/m². Chief Complaint   Patient presents with    Results     Bone Density        Pt presents today to discuss her DEXA results from a scan on 10/8/2020. The scan showed that This patient is osteoporotic using the World Health Organization criteria. Her 10 year probability of major osteoporotic fracture is 9.6% and her 10 year probability of hip fracture is 1.5%. Pt and I discussed taking vitamin d 1,000 international units/day and calcium 600 mg/day. I also advised pt to make sure she is getting sufficient weight bearing exercise. Pt informs me she plays tennis several times a week. Pt had a CT of the heart w/o contrast with calcium on 10/1/2020. This found that pt has a total calcium score of 32. This indicates mild evidence of plaque making mild or minimal coronary stenosis likely. Pt also had a 4 mm pulmonary nodule in the right middle lobe present. Pt and I discuss this benign growth during today's OV. Current Outpatient Medications   Medication Sig Dispense Refill    ASCORBATE CALCIUM (VITAMIN C PO) Take 1,000 mg by mouth daily.  CALCIUM PO Take 600 mg by mouth daily. No Known Allergies  Past Medical History:   Diagnosis Date    Adverse effect of anesthesia     HARD TO WAKE    Hyperlipidemia 7/9/2012     Past Surgical History:   Procedure Laterality Date    ENDOSCOPY, COLON, DIAGNOSTIC  2007    polyp Follow up 9/2012;  Dr. Maykel Patrick HX HEENT  9/10    cholesteatoma removal and ruptured ear drum    HX HEENT  2005    NASAL REPAIR    HX OOPHORECTOMY  1980    HX TONSILLECTOMY       Family History   Problem Relation Age of Onset    Hypertension Mother     Cancer Maternal Aunt breast    Breast Cancer Maternal Aunt         70's    No Known Problems Sister     No Known Problems Sister     No Known Problems Daughter     No Known Problems Daughter     Heart Disease Maternal Grandmother         PACEMAKER    Anesth Problems Neg Hx     Asthma Neg Hx     Diabetes Neg Hx      Social History     Tobacco Use    Smoking status: Former Smoker     Packs/day: 0.25     Years: 21.00     Pack years: 5.25     Last attempt to quit: 1988     Years since quittin.8    Smokeless tobacco: Never Used   Substance Use Topics    Alcohol use: Yes     Frequency: 2-4 times a month     Comment: ocassionally/ once per week        Review of Systems   Constitutional: Negative for chills and fever. HENT: Negative for hearing loss and tinnitus. Eyes: Negative for blurred vision and double vision. Respiratory: Negative for cough and shortness of breath. Cardiovascular: Negative for chest pain and palpitations. Gastrointestinal: Negative for nausea and vomiting. Genitourinary: Negative for dysuria and frequency. Musculoskeletal: Negative for back pain and falls. Skin: Negative for itching and rash. Neurological: Negative for dizziness, loss of consciousness and headaches. Endo/Heme/Allergies: Negative. Psychiatric/Behavioral: Negative for depression. The patient is not nervous/anxious. Physical Exam  Constitutional:       Appearance: Normal appearance. HENT:      Head: Normocephalic and atraumatic. Right Ear: Tympanic membrane, ear canal and external ear normal.      Left Ear: Tympanic membrane, ear canal and external ear normal.      Nose: Nose normal.      Mouth/Throat:      Mouth: Mucous membranes are moist.      Pharynx: Oropharynx is clear. Eyes:      Extraocular Movements: Extraocular movements intact. Conjunctiva/sclera: Conjunctivae normal.      Pupils: Pupils are equal, round, and reactive to light.    Cardiovascular:      Rate and Rhythm: Normal rate and regular rhythm. Pulses: Normal pulses. Heart sounds: Normal heart sounds. Pulmonary:      Effort: Pulmonary effort is normal.      Breath sounds: Normal breath sounds. Abdominal:      General: Abdomen is flat. Bowel sounds are normal.      Palpations: Abdomen is soft. Genitourinary:     General: Normal vulva. Rectum: Normal.   Musculoskeletal: Normal range of motion. Skin:     General: Skin is warm and dry. Neurological:      General: No focal deficit present. Mental Status: She is alert and oriented to person, place, and time. Mental status is at baseline. Psychiatric:         Mood and Affect: Mood normal.         Behavior: Behavior normal.         Judgment: Judgment normal.           ASSESSMENT and PLAN  Diagnoses and all orders for this visit:    1. Incidental pulmonary nodule, > 3mm and < 8mm  Pt had a CT of the heart w/o contrast with calcium on 10/1/2020. This found that pt has a total calcium score of 32. This indicates mild evidence of plaque making mild or minimal coronary stenosis likely. Pt also had a 4 mm pulmonary nodule in the right middle lobe present. Pt and I discuss this benign growth during today's OV. 2. Osteoporosis, unspecified osteoporosis type, unspecified pathological fracture presence  Pt presents today to discuss her DEXA results from a scan on 10/8/2020. The scan showed that This patient is osteoporotic using the World Health Organization criteria. Her 10 year probability of major osteoporotic fracture is 9.6% and her 10 year probability of hip fracture is 1.5%. Pt and I discussed taking vitamin d 1,000 international units/day and calcium 600 mg/day. I also advised pt to make sure she is getting sufficient weight bearing exercise. Pt informs me she plays tennis several times a week. Medication risks/benefits/costs/interactions/alternatives discussed with patient.   Advised patient to call back or return to office if symptoms worsen/change/persist.  If patient cannot reach us or should anything more severe/urgent arise he/she should proceed directly to the nearest emergency department. Discussed expected course/resolution/complications of diagnosis in detail with patient. Patient given a written after visit summary which includes diagnoses, current medications and vitals. Patient expressed understanding with the diagnosis and plan. Written by dirk Xie, as dictated by Adrian Parrish M.D.    2:57 PM - 3:36 PM    Total time spent with the patient 39 minutes, greater than 50% of time spent counseling patient.

## 2020-11-03 NOTE — PATIENT INSTRUCTIONS
Body Mass Index: Care Instructions Your Care Instructions Body mass index (BMI) can help you see if your weight is raising your risk for health problems. It uses a formula to compare how much you weigh with how tall you are. · A BMI lower than 18.5 is considered underweight. · A BMI between 18.5 and 24.9 is considered healthy. · A BMI between 25 and 29.9 is considered overweight. A BMI of 30 or higher is considered obese. If your BMI is in the normal range, it means that you have a lower risk for weight-related health problems. If your BMI is in the overweight or obese range, you may be at increased risk for weight-related health problems, such as high blood pressure, heart disease, stroke, arthritis or joint pain, and diabetes. If your BMI is in the underweight range, you may be at increased risk for health problems such as fatigue, lower protection (immunity) against illness, muscle loss, bone loss, hair loss, and hormone problems. BMI is just one measure of your risk for weight-related health problems. You may be at higher risk for health problems if you are not active, you eat an unhealthy diet, or you drink too much alcohol or use tobacco products. Follow-up care is a key part of your treatment and safety. Be sure to make and go to all appointments, and call your doctor if you are having problems. It's also a good idea to know your test results and keep a list of the medicines you take. How can you care for yourself at home? · Practice healthy eating habits. This includes eating plenty of fruits, vegetables, whole grains, lean protein, and low-fat dairy. · If your doctor recommends it, get more exercise. Walking is a good choice. Bit by bit, increase the amount you walk every day. Try for at least 30 minutes on most days of the week. · Do not smoke. Smoking can increase your risk for health problems.  If you need help quitting, talk to your doctor about stop-smoking programs and medicines. These can increase your chances of quitting for good. · Limit alcohol to 2 drinks a day for men and 1 drink a day for women. Too much alcohol can cause health problems. If you have a BMI higher than 25 · Your doctor may do other tests to check your risk for weight-related health problems. This may include measuring the distance around your waist. A waist measurement of more than 40 inches in men or 35 inches in women can increase the risk of weight-related health problems. · Talk with your doctor about steps you can take to stay healthy or improve your health. You may need to make lifestyle changes to lose weight and stay healthy, such as changing your diet and getting regular exercise. If you have a BMI lower than 18.5 · Your doctor may do other tests to check your risk for health problems. · Talk with your doctor about steps you can take to stay healthy or improve your health. You may need to make lifestyle changes to gain or maintain weight and stay healthy, such as getting more healthy foods in your diet and doing exercises to build muscle. Where can you learn more? Go to http://www.lópez.com/ Enter S176 in the search box to learn more about \"Body Mass Index: Care Instructions. \" Current as of: December 11, 2019               Content Version: 12.6 © 1336-8493 [employer]. Care instructions adapted under license by [employer] (which disclaims liability or warranty for this information). If you have questions about a medical condition or this instruction, always ask your healthcare professional. [address] any warranty or liability for your use of this information.

## 2021-02-16 LAB — SARS-COV-2, NAA: NEGATIVE

## 2021-03-02 LAB — SARS-COV-2, NAA: NEGATIVE

## 2021-05-24 ENCOUNTER — TELEPHONE (OUTPATIENT)
Dept: FAMILY MEDICINE CLINIC | Age: 71
End: 2021-05-24

## 2021-05-24 NOTE — TELEPHONE ENCOUNTER
Pt would like to be referred to a specialist to assist with weakness and pain behind her right eye. Pt is scheduled for 6/22 with PCP. If she has to be seen first she would like to see if she can be seen sooner than 6/22 for in office visit.        Best contact number(s): [phone]

## 2021-05-24 NOTE — TELEPHONE ENCOUNTER
TC to Patient ID verified. Has c/o Fatigue, bubble in head. Heavy eyes. Has appointment June 22nd. been having this off and on for 2 years  Nothing you can pin point. Has had labs and MRI for this before.

## 2021-06-22 ENCOUNTER — OFFICE VISIT (OUTPATIENT)
Dept: FAMILY MEDICINE CLINIC | Age: 71
End: 2021-06-22
Payer: MEDICARE

## 2021-06-22 VITALS
WEIGHT: 142 LBS | HEART RATE: 73 BPM | RESPIRATION RATE: 16 BRPM | BODY MASS INDEX: 22.82 KG/M2 | OXYGEN SATURATION: 97 % | HEIGHT: 66 IN | TEMPERATURE: 99.2 F | SYSTOLIC BLOOD PRESSURE: 115 MMHG | DIASTOLIC BLOOD PRESSURE: 71 MMHG

## 2021-06-22 DIAGNOSIS — R42 CERVICAL VERTIGO: Primary | ICD-10-CM

## 2021-06-22 DIAGNOSIS — R42 DIZZINESS: ICD-10-CM

## 2021-06-22 DIAGNOSIS — H53.9 VISUAL CHANGES: ICD-10-CM

## 2021-06-22 DIAGNOSIS — R68.89 FULLNESS IN HEAD: ICD-10-CM

## 2021-06-22 DIAGNOSIS — E78.5 HYPERLIPIDEMIA, UNSPECIFIED HYPERLIPIDEMIA TYPE: ICD-10-CM

## 2021-06-22 DIAGNOSIS — R53.81 MALAISE: ICD-10-CM

## 2021-06-22 DIAGNOSIS — E55.9 VITAMIN D DEFICIENCY: ICD-10-CM

## 2021-06-22 LAB
25(OH)D3 SERPL-MCNC: 48.9 NG/ML (ref 30–100)
ALBUMIN SERPL-MCNC: 3.8 G/DL (ref 3.5–5)
ALBUMIN/GLOB SERPL: 1.4 {RATIO} (ref 1.1–2.2)
ALP SERPL-CCNC: 103 U/L (ref 45–117)
ALT SERPL-CCNC: 19 U/L (ref 12–78)
ANION GAP SERPL CALC-SCNC: 5 MMOL/L (ref 5–15)
APPEARANCE UR: CLEAR
AST SERPL-CCNC: 17 U/L (ref 15–37)
BACTERIA URNS QL MICRO: NEGATIVE /HPF
BASOPHILS # BLD: 0.1 K/UL (ref 0–0.1)
BASOPHILS NFR BLD: 1 % (ref 0–1)
BILIRUB SERPL-MCNC: 0.4 MG/DL (ref 0.2–1)
BILIRUB UR QL: NEGATIVE
BUN SERPL-MCNC: 17 MG/DL (ref 6–20)
BUN/CREAT SERPL: 24 (ref 12–20)
CALCIUM SERPL-MCNC: 9 MG/DL (ref 8.5–10.1)
CHLORIDE SERPL-SCNC: 110 MMOL/L (ref 97–108)
CO2 SERPL-SCNC: 27 MMOL/L (ref 21–32)
COLOR UR: ABNORMAL
CREAT SERPL-MCNC: 0.72 MG/DL (ref 0.55–1.02)
DIFFERENTIAL METHOD BLD: NORMAL
EOSINOPHIL # BLD: 0 K/UL (ref 0–0.4)
EOSINOPHIL NFR BLD: 1 % (ref 0–7)
EPITH CASTS URNS QL MICRO: ABNORMAL /LPF
ERYTHROCYTE [DISTWIDTH] IN BLOOD BY AUTOMATED COUNT: 12.4 % (ref 11.5–14.5)
GLOBULIN SER CALC-MCNC: 2.8 G/DL (ref 2–4)
GLUCOSE SERPL-MCNC: 104 MG/DL (ref 65–100)
GLUCOSE UR STRIP.AUTO-MCNC: NEGATIVE MG/DL
HCT VFR BLD AUTO: 39 % (ref 35–47)
HGB BLD-MCNC: 12.8 G/DL (ref 11.5–16)
HGB UR QL STRIP: NEGATIVE
HYALINE CASTS URNS QL MICRO: ABNORMAL /LPF (ref 0–5)
IMM GRANULOCYTES # BLD AUTO: 0 K/UL (ref 0–0.04)
IMM GRANULOCYTES NFR BLD AUTO: 0 % (ref 0–0.5)
KETONES UR QL STRIP.AUTO: NEGATIVE MG/DL
LEUKOCYTE ESTERASE UR QL STRIP.AUTO: ABNORMAL
LYMPHOCYTES # BLD: 0.9 K/UL (ref 0.8–3.5)
LYMPHOCYTES NFR BLD: 15 % (ref 12–49)
MCH RBC QN AUTO: 31.1 PG (ref 26–34)
MCHC RBC AUTO-ENTMCNC: 32.8 G/DL (ref 30–36.5)
MCV RBC AUTO: 94.7 FL (ref 80–99)
MONOCYTES # BLD: 0.6 K/UL (ref 0–1)
MONOCYTES NFR BLD: 11 % (ref 5–13)
NEUTS SEG # BLD: 4.3 K/UL (ref 1.8–8)
NEUTS SEG NFR BLD: 72 % (ref 32–75)
NITRITE UR QL STRIP.AUTO: NEGATIVE
NRBC # BLD: 0 K/UL (ref 0–0.01)
NRBC BLD-RTO: 0 PER 100 WBC
PH UR STRIP: 6.5 [PH] (ref 5–8)
PLATELET # BLD AUTO: 237 K/UL (ref 150–400)
PMV BLD AUTO: 10.5 FL (ref 8.9–12.9)
POTASSIUM SERPL-SCNC: 4.3 MMOL/L (ref 3.5–5.1)
PROT SERPL-MCNC: 6.6 G/DL (ref 6.4–8.2)
PROT UR STRIP-MCNC: NEGATIVE MG/DL
RBC # BLD AUTO: 4.12 M/UL (ref 3.8–5.2)
RBC #/AREA URNS HPF: ABNORMAL /HPF (ref 0–5)
SODIUM SERPL-SCNC: 142 MMOL/L (ref 136–145)
SP GR UR REFRACTOMETRY: 1.01 (ref 1–1.03)
T4 FREE SERPL-MCNC: 0.8 NG/DL (ref 0.8–1.5)
TSH SERPL DL<=0.05 MIU/L-ACNC: 1.95 UIU/ML (ref 0.36–3.74)
UROBILINOGEN UR QL STRIP.AUTO: 0.2 EU/DL (ref 0.2–1)
WBC # BLD AUTO: 5.9 K/UL (ref 3.6–11)
WBC URNS QL MICRO: ABNORMAL /HPF (ref 0–4)

## 2021-06-22 PROCEDURE — 1090F PRES/ABSN URINE INCON ASSESS: CPT | Performed by: FAMILY MEDICINE

## 2021-06-22 PROCEDURE — G8510 SCR DEP NEG, NO PLAN REQD: HCPCS | Performed by: FAMILY MEDICINE

## 2021-06-22 PROCEDURE — G0463 HOSPITAL OUTPT CLINIC VISIT: HCPCS | Performed by: FAMILY MEDICINE

## 2021-06-22 PROCEDURE — G8399 PT W/DXA RESULTS DOCUMENT: HCPCS | Performed by: FAMILY MEDICINE

## 2021-06-22 PROCEDURE — 99214 OFFICE O/P EST MOD 30 MIN: CPT | Performed by: FAMILY MEDICINE

## 2021-06-22 PROCEDURE — 3017F COLORECTAL CA SCREEN DOC REV: CPT | Performed by: FAMILY MEDICINE

## 2021-06-22 PROCEDURE — G8427 DOCREV CUR MEDS BY ELIG CLIN: HCPCS | Performed by: FAMILY MEDICINE

## 2021-06-22 PROCEDURE — G8420 CALC BMI NORM PARAMETERS: HCPCS | Performed by: FAMILY MEDICINE

## 2021-06-22 PROCEDURE — 1101F PT FALLS ASSESS-DOCD LE1/YR: CPT | Performed by: FAMILY MEDICINE

## 2021-06-22 PROCEDURE — G8536 NO DOC ELDER MAL SCRN: HCPCS | Performed by: FAMILY MEDICINE

## 2021-06-22 PROCEDURE — G9899 SCRN MAM PERF RSLTS DOC: HCPCS | Performed by: FAMILY MEDICINE

## 2021-06-22 RX ORDER — GLUCOSAMINE SULFATE 1500 MG
POWDER IN PACKET (EA) ORAL DAILY
COMMUNITY

## 2021-06-22 NOTE — PROGRESS NOTES
Chief Complaint   Patient presents with    Referral Request     1. Have you been to the ER, urgent care clinic since your last visit? Hospitalized since your last visit?no    2. Have you seen or consulted any other health care providers outside of the [address] [place] [address] since your last visit? Include any pap smears or colon screening.  no

## 2021-06-22 NOTE — PROGRESS NOTES
CHRIS Lackey 69 y.o. female  presents to the office today for a referral request.     Blood pressure 115/71, pulse 73, temperature 99.2 °F (37.3 °C), temperature source Temporal, resp. rate 16, height 5' 6\" (1.676 m), weight 142 lb (64.4 kg), SpO2 97 %. Body mass index is 22.92 kg/m². Chief Complaint   Patient presents with    Referral Request    Dizziness        Vitamin D deficiency: Pt's vitamin D levels were 28.5 on 7/20/2018. Pt continues with cholecalciferol. Hyperlipidemia: Lipid panel on 4/13/2017 notable for total cholesterol 292, HDL 88, , and triglycerides 84. Pt continues with her current regimen. Health maintenance: Pt will have updated lab work performed during today's OV. Pt has been experiencing a \"pressure\" in the frontal aspect of her skull, increased fatigue, and headaches for the past two years. Pt describes her sxs as an \"overall feeling of being unwell and malaise\". However, over the last few months, pt has begun to develop vertigo, which she believes is positional in nature. While discussing pt's sxs, she notes that she fell while horseback riding 40 years ago, injuring her right shoulder, the right side of her neck, and the right aspect of her cervical spine. Pt had an MRI of the brain performed two years ago; her results were normal. However, pt continues to worry about her sxs as they have worsened. I have placed orders for pt to undergo an MRI of the cervical spine and a carotid doplar for further analysis. She has also been provided with a referral to neurology. Labwork will also be performed. Current Outpatient Medications   Medication Sig Dispense Refill    cholecalciferol (Vitamin D3) 25 mcg (1,000 unit) cap Take  by mouth daily.  ASCORBATE CALCIUM (VITAMIN C PO) Take 1,000 mg by mouth daily.  CALCIUM PO Take 600 mg by mouth daily.        No Known Allergies  Past Medical History:   Diagnosis Date    Adverse effect of anesthesia     HARD TO WAKE    Hyperlipidemia 2012     Past Surgical History:   Procedure Laterality Date    ENDOSCOPY, COLON, DIAGNOSTIC      polyp Follow up 2012; Dr. Rhina Nunez    HX  SECTION      X2    HX HEENT  9/10    cholesteatoma removal and ruptured ear drum    HX HEENT      NASAL REPAIR    HX OOPHORECTOMY  1980    HX TONSILLECTOMY       Family History   Problem Relation Age of Onset    Hypertension Mother     Cancer Maternal Aunt         breast    Breast Cancer Maternal Aunt         66's    No Known Problems Sister     No Known Problems Sister     No Known Problems Daughter     No Known Problems Daughter     Heart Disease Maternal Grandmother         PACEMAKER    Anesth Problems Neg Hx     Asthma Neg Hx     Diabetes Neg Hx      Social History     Tobacco Use    Smoking status: Former Smoker     Packs/day: 0.25     Years: 21.00     Pack years: 5.25     Quit date: 1988     Years since quittin.4    Smokeless tobacco: Never Used   Substance Use Topics    Alcohol use: Yes     Comment: ocassionally/ once per week        Review of Systems   Constitutional: Negative for chills and fever. HENT: Negative for hearing loss and tinnitus. Eyes: Negative for blurred vision and double vision. Respiratory: Negative for cough and shortness of breath. Cardiovascular: Negative for chest pain and palpitations. Gastrointestinal: Negative for nausea and vomiting. Genitourinary: Negative for dysuria and frequency. Musculoskeletal: Negative for back pain and falls. Skin: Negative for itching and rash. Neurological: Negative for dizziness, loss of consciousness and headaches. Endo/Heme/Allergies: Negative. Psychiatric/Behavioral: Negative for depression. The patient is not nervous/anxious. Physical Exam  Constitutional:       Appearance: Normal appearance. HENT:      Head: Normocephalic and atraumatic.       Right Ear: Tympanic membrane, ear canal and external ear normal. Left Ear: Tympanic membrane, ear canal and external ear normal.      Nose: Nose normal.      Mouth/Throat:      Mouth: Mucous membranes are moist.      Pharynx: Oropharynx is clear. Eyes:      Extraocular Movements: Extraocular movements intact. Conjunctiva/sclera: Conjunctivae normal.      Pupils: Pupils are equal, round, and reactive to light. Cardiovascular:      Rate and Rhythm: Normal rate and regular rhythm. Pulses: Normal pulses. Heart sounds: Normal heart sounds. Pulmonary:      Effort: Pulmonary effort is normal.      Breath sounds: Normal breath sounds. Abdominal:      General: Abdomen is flat. Bowel sounds are normal.      Palpations: Abdomen is soft. Genitourinary:     General: Normal vulva. Rectum: Normal.   Musculoskeletal:         General: Normal range of motion. Skin:     General: Skin is warm and dry. Neurological:      General: No focal deficit present. Mental Status: She is alert and oriented to person, place, and time. Mental status is at baseline. Coordination: Romberg sign negative. Gait: Gait normal.   Psychiatric:         Mood and Affect: Mood normal.         Behavior: Behavior normal.         Judgment: Judgment normal.           ASSESSMENT and PLAN  Diagnoses and all orders for this visit:    1. Cervical vertigo  Pt has been experiencing a \"pressure\" in the frontal aspect of her skull, increased fatigue, and headaches for the past two years. Pt describes her sxs as an \"overall feeling of being unwell and malaise\". Pt had an MRI of the brain performed two years ago; her results were normal. However, pt continues to worry about her sxs as they have worsened. I have placed orders for pt to undergo an MRI of the cervical spine and a carotid doplar for further analysis. She has also been provided with a referral to neurology. Labwork will also be performed. -     MRI CERV SPINE WO CONT; Future    2.  Dizziness  Refer to #1.   -     MRI BRAIN W WO CONT; Future  -     DUPLEX CAROTID BILATERAL; Future    3. Fullness in head  Refer to #1.   -     MRI BRAIN W WO CONT; Future    4. Visual changes  Refer to #1.   -     MRI BRAIN W WO CONT; Future    5. Malaise  Refer to #1. Pt will have updated lab work performed during today's [address]; Future  -     CBC WITH AUTOMATED DIFF; Future  -     TSH 3RD GENERATION; Future  -     T4, FREE; Future  -     URINALYSIS W/MICROSCOPIC; Future    6. Hyperlipidemia, unspecified hyperlipidemia type  Lipid panel on 4/13/2017 notable for total cholesterol 292, HDL 88, , and triglycerides 84. Pt continues with her current regimen.   -     NMR LIPOPROFILE WITH LIPIDS (WITHOUT GRAPH); Future    7. Vitamin D deficiency  Pt's vitamin D levels were 28.5 on 7/20/2018. Pt continues with cholecalciferol.   -     VITAMIN D, 25 HYDROXY; Future        Follow-up and Dispositions    · Return in about 4 weeks (around 7/20/2021) for follow up. Medication risks/benefits/costs/interactions/alternatives discussed with patient. Advised patient to call back or return to office if symptoms worsen/change/persist.  If patient cannot reach us or should anything more severe/urgent arise he/she should proceed directly to the nearest emergency department. Discussed expected course/resolution/complications of diagnosis in detail with patient. Patient given a written after visit summary which includes diagnoses, current medications and vitals. Patient expressed understanding with the diagnosis and plan. Written by dirk Berumen, as dictated by Migue Cam M.D.    8:58 AM - 9:20 AM    Total time spent with the patient 22 minutes, greater than 50% of time spent counseling patient.

## 2021-06-23 LAB
CHOLEST SERPL-MCNC: 303 MG/DL (ref 100–199)
HDL SERPL-SCNC: 32.6 UMOL/L
HDLC SERPL-MCNC: 88 MG/DL
LDL SERPL QN: 22.3 NM
LDL SERPL-SCNC: 1786 NMOL/L
LDL SMALL SERPL-SCNC: <90 NMOL/L
LDLC SERPL CALC-MCNC: 203 MG/DL (ref 0–99)
LP-IR SCORE SERPL: <25
TRIGL SERPL-MCNC: 76 MG/DL (ref 0–149)

## 2021-07-01 ENCOUNTER — HOSPITAL ENCOUNTER (OUTPATIENT)
Dept: ULTRASOUND IMAGING | Age: 71
Discharge: HOME | End: 2021-07-01
Attending: FAMILY MEDICINE
Payer: MEDICARE

## 2021-07-01 ENCOUNTER — HOSPITAL ENCOUNTER (OUTPATIENT)
Dept: MRI IMAGING | Age: 71
Discharge: HOME | End: 2021-07-01
Attending: FAMILY MEDICINE
Payer: MEDICARE

## 2021-07-01 VITALS — WEIGHT: 140 LBS | BODY MASS INDEX: 22.6 KG/M2

## 2021-07-01 DIAGNOSIS — R42 DIZZINESS: ICD-10-CM

## 2021-07-01 DIAGNOSIS — H53.9 VISUAL CHANGES: ICD-10-CM

## 2021-07-01 DIAGNOSIS — R42 CERVICAL VERTIGO: ICD-10-CM

## 2021-07-01 DIAGNOSIS — R68.89 FULLNESS IN HEAD: ICD-10-CM

## 2021-07-01 LAB
LEFT ICA/CCA SYS: 1.5
RIGHT ICA/CCA SYS: 0.6

## 2021-07-01 PROCEDURE — 72141 MRI NECK SPINE W/O DYE: CPT

## 2021-07-01 PROCEDURE — 93880 EXTRACRANIAL BILAT STUDY: CPT

## 2021-07-01 PROCEDURE — 70553 MRI BRAIN STEM W/O & W/DYE: CPT

## 2021-07-01 PROCEDURE — A9575 INJ GADOTERATE MEGLUMI 0.1ML: HCPCS | Performed by: FAMILY MEDICINE

## 2021-07-01 PROCEDURE — 74011250636 HC RX REV CODE- 250/636: Performed by: FAMILY MEDICINE

## 2021-07-01 RX ORDER — GADOTERATE MEGLUMINE 376.9 MG/ML
13 INJECTION INTRAVENOUS
Status: COMPLETED | OUTPATIENT
Start: 2021-07-01 | End: 2021-07-01

## 2021-07-01 RX ADMIN — GADOTERATE MEGLUMINE 13 ML: 376.9 INJECTION INTRAVENOUS at 11:00

## 2021-07-05 NOTE — PROGRESS NOTES
Right Carotid    The right CCA is patent. There is intimal thickening present in the right CCA. Carotid bulb has intimal thickening plaque. The right ICA is normal. The right proximal ICA is tortuous. The right ECA is patent. The right ECA is tortuous. The right vertebral is antegrade. There is no stenosis in the right vertebral artery. Left Carotid    The left CCA is patent. There is intimal thickening present in the left CCA. Carotid bulb has intimal thickening plaque. The left ICA is normal. The left proximal ICA is tortuous. The left middle ICA is tortuous. The left ECA is patent. The left ECA is tortuous. The left vertebral is antegrade. There is no stenosis in the left vertebral artery.

## 2021-07-05 NOTE — PROGRESS NOTES
FINDINGS:  The ventricles are normal in size and position. There is no acute infarct,  hemorrhage, extra-axial fluid collection, or mass effect. There is no cerebellar  tonsillar herniation. Expected arterial flow-voids are present. No evidence of  abnormal enhancement. Patchy bilateral nonspecific white matter changes.     The paranasal sinuses, mastoid air cells, and middle ears are clear. The orbital  contents are within normal limits. No significant osseous or scalp lesions are  identified.     IMPRESSION  Mild nonspecific white matter changes not unexpected for age.  No acute  intracranial abnormality

## 2021-07-05 NOTE — PROGRESS NOTES
Mrs. Sudha Gama,  Your cholesterol numbers are still elevated. It has decreased a little bit with regards to your LDL-C from 218-2 03. I know we have discussed statin initiation in the past but you I choose not to start statins. I advised that in October we recheck a CT of the heart to make sure the calcifications have not increased in the heart. Again I do advocate for you to start statins because her LDL is above 200.

## 2021-07-05 NOTE — PROGRESS NOTES
We need to talk with regards to next steps with regards to your cervical MRI findings. There is a concern between C4-C5 C5 and C6. Let us having a virtual visit so we can discuss in detail and discuss neck steps. FINDINGS: Alignment is normal. There are endplate degenerative changes without  fracture. . The visualized posterior fossa and cord signal are normal.  Paraspinous soft tissues are within normal limits.     Craniocervical junction: Intact. Without stenosis     C2-C3: Left facet arthropathy without stenosis     C3-C4: Small disc osteophytic bulge with uncovertebral degenerative change  asymmetric to the right and right facet arthropathy. Canal stenosis is mild with  moderate right and mild left foraminal narrowing     C4-C5: There is disc height loss and degeneration of this disc with a diffuse  disc osteophytic bulge. Uncovertebral degenerative change left greater than  right. Canal stenosis is mild with moderate to severe left and moderate right  foraminal narrowing     C5-C6: Diffuse disc osteophytic bulge with bilateral uncovertebral degenerative  change. Mild narrowing of the canal with moderate left and mild right foraminal  narrowing     C6-C7: Slight disc osteophytic bulge with uncovertebral degenerative change. Mild canal or foraminal narrowing     Examination of the upper thoracic spine demonstrates no significant stenosis.     IMPRESSION  1.  Multilevel degenerative change detailed by level above most significant at  C4-5 and C5-6

## 2021-07-05 NOTE — PROGRESS NOTES
Mrs. Mary Lou Keith,  Your cholesterol numbers are still elevated. It has decreased a little bit with regards to your LDL-C from 218-2 03. I know we have discussed statin initiation in the past but you I choose not to start statins. I advised that in October we recheck a CT of the heart to make sure the calcifications have not increased in the heart. Again I do advocate for you to start statins because her LDL is above 200.

## 2021-08-09 ENCOUNTER — OFFICE VISIT (OUTPATIENT)
Dept: FAMILY MEDICINE CLINIC | Age: 71
End: 2021-08-09
Payer: MEDICARE

## 2021-08-09 VITALS
WEIGHT: 142 LBS | DIASTOLIC BLOOD PRESSURE: 80 MMHG | SYSTOLIC BLOOD PRESSURE: 124 MMHG | HEIGHT: 66 IN | BODY MASS INDEX: 22.82 KG/M2 | OXYGEN SATURATION: 98 % | TEMPERATURE: 98.8 F | HEART RATE: 82 BPM | RESPIRATION RATE: 16 BRPM

## 2021-08-09 DIAGNOSIS — M48.02 CERVICAL SPINAL STENOSIS: ICD-10-CM

## 2021-08-09 DIAGNOSIS — R42 CERVICAL VERTIGO: Primary | ICD-10-CM

## 2021-08-09 DIAGNOSIS — R53.81 MALAISE: ICD-10-CM

## 2021-08-09 PROCEDURE — G8420 CALC BMI NORM PARAMETERS: HCPCS | Performed by: FAMILY MEDICINE

## 2021-08-09 PROCEDURE — 99213 OFFICE O/P EST LOW 20 MIN: CPT | Performed by: FAMILY MEDICINE

## 2021-08-09 PROCEDURE — G0463 HOSPITAL OUTPT CLINIC VISIT: HCPCS | Performed by: FAMILY MEDICINE

## 2021-08-09 PROCEDURE — 1090F PRES/ABSN URINE INCON ASSESS: CPT | Performed by: FAMILY MEDICINE

## 2021-08-09 PROCEDURE — G9899 SCRN MAM PERF RSLTS DOC: HCPCS | Performed by: FAMILY MEDICINE

## 2021-08-09 PROCEDURE — G8427 DOCREV CUR MEDS BY ELIG CLIN: HCPCS | Performed by: FAMILY MEDICINE

## 2021-08-09 PROCEDURE — G8399 PT W/DXA RESULTS DOCUMENT: HCPCS | Performed by: FAMILY MEDICINE

## 2021-08-09 PROCEDURE — 1101F PT FALLS ASSESS-DOCD LE1/YR: CPT | Performed by: FAMILY MEDICINE

## 2021-08-09 PROCEDURE — G8510 SCR DEP NEG, NO PLAN REQD: HCPCS | Performed by: FAMILY MEDICINE

## 2021-08-09 PROCEDURE — 3017F COLORECTAL CA SCREEN DOC REV: CPT | Performed by: FAMILY MEDICINE

## 2021-08-09 PROCEDURE — G8536 NO DOC ELDER MAL SCRN: HCPCS | Performed by: FAMILY MEDICINE

## 2021-08-09 NOTE — PROGRESS NOTES
Chief Complaint   Patient presents with    Dizziness     1. Have you been to the ER, urgent care clinic since your last visit? Hospitalized since your last visit?no    2. Have you seen or consulted any other health care providers outside of the [address] [place] [address] since your last visit? Include any pap smears or colon screening. Neurology   Eye exam     Faxed MRI results to DR. Randy Dance  ENT per Patient request.

## 2021-08-09 NOTE — PROGRESS NOTES
CHRIS Bryson Felling 69 y.o. female  presents to the office today for dizziness. Blood pressure 124/80, pulse 82, temperature 98.8 °F (37.1 °C), temperature source Temporal, resp. rate 16, height 5' 6\" (1.676 m), weight 142 lb (64.4 kg), SpO2 98 %. Body mass index is 22.92 kg/m². Chief Complaint   Patient presents with    Dizziness    Results      Pt reports that she regularly exercises by playing tennis multiple times per week. Pt was previously under the care of Dr. Carrie Solo, Neurology, due to a long-standing h/o vertigo and fatigue and was previously dx'd with juana-facial spasms, per pt. MRI of her brain, neck and carotid arteries from 06/22/21 showed degenerative changes in her C4/5 and C5/6 vertebrae with foraminal stenosis L>R. I discussed the results of this imaging at length with the pt. She denies having any pain, numbness, or weakness in her bilateral arms. She states that her episodes of vertigo have improved in frequency since they began. She is currently asymptomatic. She reports that her sxs only appear with specific movements, like moving her head from side to side quickly. She is also under the care of Dr. Opal Yeager, ENT for her h/o vertigo. Vitamin D deficiency: Pt's vitamin D levels were 48.9 on 06/22/21. Pt continues with cholecalciferol 1000 units daily. Current Outpatient Medications   Medication Sig Dispense Refill    cholecalciferol (Vitamin D3) 25 mcg (1,000 unit) cap Take  by mouth daily.  ASCORBATE CALCIUM (VITAMIN C PO) Take 1,000 mg by mouth daily.  CALCIUM PO Take 600 mg by mouth daily. No Known Allergies  Past Medical History:   Diagnosis Date    Adverse effect of anesthesia     HARD TO WAKE    Hyperlipidemia 7/9/2012     Past Surgical History:   Procedure Laterality Date    ENDOSCOPY, COLON, DIAGNOSTIC  2007    polyp Follow up 9/2012;  Dr. Radha MARIE  9/10    cholesteatoma removal and ruptured ear drum    ELIAS MARIE 2005    NASAL REPAIR    HX OOPHORECTOMY  1980    HX TONSILLECTOMY       Family History   Problem Relation Age of Onset    Hypertension Mother     Cancer Maternal Aunt         breast    Breast Cancer Maternal Aunt         66's    No Known Problems Sister     No Known Problems Sister     No Known Problems Daughter     No Known Problems Daughter     Heart Disease Maternal Grandmother         PACEMAKER    Anesth Problems Neg Hx     Asthma Neg Hx     Diabetes Neg Hx      Social History     Tobacco Use    Smoking status: Former Smoker     Packs/day: 0.25     Years: 21.00     Pack years: 5.25     Quit date: 1988     Years since quittin.6    Smokeless tobacco: Never Used   Substance Use Topics    Alcohol use: Yes     Comment: ocassionally/ once per week        Review of Systems   Constitutional: Negative for chills, fever and malaise/fatigue. HENT: Negative for hearing loss and tinnitus. Eyes: Negative for blurred vision and double vision. Respiratory: Negative for cough and shortness of breath. Cardiovascular: Negative for chest pain and palpitations. Gastrointestinal: Negative for nausea and vomiting. Genitourinary: Negative for dysuria and frequency. Musculoskeletal: Negative for back pain and falls. Skin: Negative for itching and rash. Neurological: Negative for dizziness, loss of consciousness and headaches. Endo/Heme/Allergies: Negative. Psychiatric/Behavioral: Negative for depression. The patient is not nervous/anxious. Physical Exam  Constitutional:       Appearance: Normal appearance. HENT:      Head: Normocephalic and atraumatic. Right Ear: Tympanic membrane, ear canal and external ear normal.      Left Ear: Tympanic membrane, ear canal and external ear normal.      Nose: Nose normal.      Mouth/Throat:      Mouth: Mucous membranes are moist.      Pharynx: Oropharynx is clear. Eyes:      Extraocular Movements: Extraocular movements intact. Conjunctiva/sclera: Conjunctivae normal.      Pupils: Pupils are equal, round, and reactive to light. Cardiovascular:      Rate and Rhythm: Normal rate and regular rhythm. Pulses: Normal pulses. Heart sounds: Normal heart sounds. Pulmonary:      Effort: Pulmonary effort is normal.      Breath sounds: Normal breath sounds. Abdominal:      General: Abdomen is flat. Bowel sounds are normal.      Palpations: Abdomen is soft. Genitourinary:     General: Normal vulva. Rectum: Normal.   Musculoskeletal:         General: Normal range of motion. Skin:     General: Skin is warm and dry. Neurological:      General: No focal deficit present. Mental Status: She is alert and oriented to person, place, and time. Mental status is at baseline. Psychiatric:         Mood and Affect: Mood normal.         Behavior: Behavior normal.         Judgment: Judgment normal.           ASSESSMENT and PLAN  Diagnoses and all orders for this visit:    1. Cervical vertigo  Pt was previously under the care of Dr. Brant Bo, Neurology, due to a long-standing h/o vertigo and fatigue and was previously dx'd with juana-facial spasms, per pt. MRI of her brain, neck and carotid arteries from 06/22/21 showed degenerative changes in her C4/5 and C5/6 vertebrae with foraminal stenosis L>R. I discussed the results of this imaging at length with the pt. She denies having any pain, numbness, or weakness in her bilateral arms. She states that her episodes of vertigo have improved in frequency since they began. She is currently asymptomatic. She reports that her sxs only appear with specific movements, like moving her head from side to side quickly. She is also under the care of Dr. Sudha Barrera, ENT for her h/o vertigo. 2. Malaise  Refer to #1. 3. Cervical spinal stenosis  Refer to #1. Follow-up and Dispositions    · Return in about 9 months (around 5/9/2022) for follow up.             Medication risks/benefits/costs/interactions/alternatives discussed with patient. Advised patient to call back or return to office if symptoms worsen/change/persist.  If patient cannot reach us or should anything more severe/urgent arise he/she should proceed directly to the nearest emergency department. Discussed expected course/resolution/complications of diagnosis in detail with patient. Patient given a written after visit summary which includes diagnoses, current medications and vitals. Patient expressed understanding with the diagnosis and plan.     Written by dirk Leon, as dictated by Mallory Jones M.D.

## 2021-09-03 ENCOUNTER — DOCUMENTATION ONLY (OUTPATIENT)
Dept: FAMILY MEDICINE CLINIC | Age: 71
End: 2021-09-03

## 2022-01-12 ENCOUNTER — NURSE TRIAGE (OUTPATIENT)
Dept: OTHER | Facility: CLINIC | Age: 72
End: 2022-01-12

## 2022-01-13 ENCOUNTER — OFFICE VISIT (OUTPATIENT)
Dept: CARDIOLOGY CLINIC | Age: 72
End: 2022-01-13
Payer: MEDICARE

## 2022-01-13 VITALS
HEART RATE: 84 BPM | BODY MASS INDEX: 23.63 KG/M2 | DIASTOLIC BLOOD PRESSURE: 80 MMHG | WEIGHT: 147 LBS | RESPIRATION RATE: 16 BRPM | OXYGEN SATURATION: 99 % | SYSTOLIC BLOOD PRESSURE: 120 MMHG | HEIGHT: 66 IN

## 2022-01-13 DIAGNOSIS — Z87.891 HISTORY OF TOBACCO USE: ICD-10-CM

## 2022-01-13 DIAGNOSIS — R00.2 HEART PALPITATIONS: Primary | ICD-10-CM

## 2022-01-13 PROCEDURE — 93005 ELECTROCARDIOGRAM TRACING: CPT | Performed by: INTERNAL MEDICINE

## 2022-01-13 PROCEDURE — 93010 ELECTROCARDIOGRAM REPORT: CPT | Performed by: INTERNAL MEDICINE

## 2022-01-13 PROCEDURE — 1090F PRES/ABSN URINE INCON ASSESS: CPT | Performed by: INTERNAL MEDICINE

## 2022-01-13 PROCEDURE — G8399 PT W/DXA RESULTS DOCUMENT: HCPCS | Performed by: INTERNAL MEDICINE

## 2022-01-13 PROCEDURE — G8510 SCR DEP NEG, NO PLAN REQD: HCPCS | Performed by: INTERNAL MEDICINE

## 2022-01-13 PROCEDURE — G8420 CALC BMI NORM PARAMETERS: HCPCS | Performed by: INTERNAL MEDICINE

## 2022-01-13 PROCEDURE — G8536 NO DOC ELDER MAL SCRN: HCPCS | Performed by: INTERNAL MEDICINE

## 2022-01-13 PROCEDURE — G8427 DOCREV CUR MEDS BY ELIG CLIN: HCPCS | Performed by: INTERNAL MEDICINE

## 2022-01-13 PROCEDURE — 1101F PT FALLS ASSESS-DOCD LE1/YR: CPT | Performed by: INTERNAL MEDICINE

## 2022-01-13 PROCEDURE — G0463 HOSPITAL OUTPT CLINIC VISIT: HCPCS | Performed by: INTERNAL MEDICINE

## 2022-01-13 PROCEDURE — G9899 SCRN MAM PERF RSLTS DOC: HCPCS | Performed by: INTERNAL MEDICINE

## 2022-01-13 PROCEDURE — 99214 OFFICE O/P EST MOD 30 MIN: CPT | Performed by: INTERNAL MEDICINE

## 2022-01-13 PROCEDURE — 3017F COLORECTAL CA SCREEN DOC REV: CPT | Performed by: INTERNAL MEDICINE

## 2022-01-13 NOTE — PROGRESS NOTES
[facility] [place]: Helen Krause  [phone]    History of Present Illness:  Ms. Cuba Minaya is a 67 yo F with a history of tobacco use, here for preop cardiac evaluation prior to septoplasty resection with Dr. Natalya Arauz, seen in the past for abnormal preop EKG. On her last visit due to preop cardiac evaluation and abnormal EKG, proceeded with an echocardiogram that was normal.  She is here now due to recent palpitations. They have been occurring for the last two weeks daily. They feel like a \"fluttering\" sensation that is constant, often times more noticeable when she is at rest.  She does play tennis and usually does not notice it when she is active. No associated exertional chest pain or shortness of breath. She does have some issues with vertigo that are chronic. She is compensated on exam with clear lungs and no lower extremity edema. Her EKG is normal sinus rhythm, heart rate of 75. Her echocardiogram in 2019 was normal and we reviewed this. Soc hx. Quit tobacco 30 yrs  Fam hx. No early CAD. Assessment and Plan:   1. Palpitations. Will obtain a 24 hour Holter for further evaluation. If this is unrevealing for arrhythmia, could be musculoskeletal strain or spasm or GI in etiology. Her echocardiogram in 2019 was normal and we reviewed this. 2. History of tobacco use. She  has a past medical history of Adverse effect of anesthesia and Hyperlipidemia (7/9/2012). She has no past medical history of Abuse, Anemia NEC, Calculus of kidney, Congestive heart failure, unspecified, Contact dermatitis and other eczema, due to unspecified cause, COPD, Depression, Headache(784.0), Psychotic disorder ([facility] [address] or Trauma. All other systems negative except as above. PE  Vitals:    01/13/22 1050 01/13/22 1053   BP: (!) 140/90 120/80   Pulse: 84    Resp: 16    SpO2: 99%    Weight: 147 lb (66.7 kg)    Height: 5' 6\" (1.676 m)     Body mass index is 23.73 kg/m².    General appearance - alert, well appearing, and in no distress  Mental status - affect appropriate to mood  Eyes - sclera anicteric, moist mucous membranes  Neck - supple, no JVD  Chest - clear to auscultation, no wheezes, rales or rhonchi  Heart - normal rate, regular rhythm, normal S1, S2, no murmurs, rubs, clicks or gallops  Abdomen - soft, nontender, nondistended, no masses or organomegaly  Neurological -no focal deficit  Extremities - peripheral pulses normal, no pedal edema    Recent Labs:  Lab Results   Component Value Date/Time    Cholesterol, total 292 (H) 2017 11:29 AM    Cholesterol, Total 303 (H) 2021 09:43 AM    HDL Cholesterol 88 2017 11:29 AM    LDL, calculated 187 (H) 2017 11:29 AM    Triglyceride 84 2017 11:29 AM     Lab Results   Component Value Date/Time    Creatinine 0.72 2021 09:43 AM     Lab Results   Component Value Date/Time    BUN 17 2021 09:43 AM     Lab Results   Component Value Date/Time    Potassium 4.3 2021 09:43 AM     Lab Results   Component Value Date/Time    Hemoglobin A1c, External 5.7 2020 12:00 AM     Lab Results   Component Value Date/Time    HGB 12.8 2021 09:43 AM     Lab Results   Component Value Date/Time    PLATELET 306  09:43 AM       Reviewed:  Past Medical History:   Diagnosis Date    Adverse effect of anesthesia     HARD TO WAKE    Hyperlipidemia 2012     Social History     Tobacco Use   Smoking Status Former Smoker    Packs/day: 0.25    Years: 21.00    Pack years: 5.25    Quit date: 1988    Years since quittin.0   Smokeless Tobacco Never Used     Social History     Substance and Sexual Activity   Alcohol Use Yes    Comment: ocassionally/ once per week     No Known Allergies    Current Outpatient Medications   Medication Sig    cholecalciferol (Vitamin D3) 25 mcg (1,000 unit) cap Take  by mouth daily.  ASCORBATE CALCIUM (VITAMIN C PO) Take 1,000 mg by mouth daily.  CALCIUM PO Take 600 mg by mouth daily.      No current facility-administered medications for this visit.        Sue Ashby MD  [facility] and [facility] [place]  [address]

## 2022-01-14 ENCOUNTER — CLINICAL SUPPORT (OUTPATIENT)
Dept: CARDIOLOGY CLINIC | Age: 72
End: 2022-01-14
Payer: MEDICARE

## 2022-01-14 DIAGNOSIS — R00.2 PALPITATIONS: Primary | ICD-10-CM

## 2022-01-14 PROCEDURE — 93225 XTRNL ECG REC<48 HRS REC: CPT | Performed by: INTERNAL MEDICINE

## 2022-01-14 PROCEDURE — 93227 XTRNL ECG REC<48 HR R&I: CPT | Performed by: INTERNAL MEDICINE

## 2022-01-17 ENCOUNTER — PATIENT MESSAGE (OUTPATIENT)
Dept: CARDIOLOGY CLINIC | Age: 72
End: 2022-01-17

## 2022-01-27 RX ORDER — METOPROLOL SUCCINATE 25 MG/1
25 TABLET, EXTENDED RELEASE ORAL DAILY
Qty: 30 TABLET | Refills: 5 | Status: SHIPPED | OUTPATIENT
Start: 2022-01-27 | End: 2022-06-27

## 2022-01-27 NOTE — TELEPHONE ENCOUNTER
Called patient. Two patient indentifiers verified. Pt was informed of the results. Questions were answered and appointment scheduled. Pt verbalized understanding and denies any further questions at this time.      Future Appointments   Date Time Provider Trey Aaron   2/4/2022  1:40 PM MD JEFFRY Dinero AMB   6/27/2022  3:15 PM [facility]

## 2022-01-27 NOTE — TELEPHONE ENCOUNTER
Sheryl Blandon RN 1/27/2022  9:01 AM EST      ----- Message -----  From: Jacoby Mcdaniel  Sent: 1/27/2022   8:59 AM EST  To: Yas Warren  Subject: HEART MONITOR                                    I don't understand this because of the medical terminology. Kenyatta Fanning Kenyatta Fanning Could you please tell me in layman's terms. Kenyatta Fanning Kenyatta Fanning Kenyatta Fanning Thank you, Ishmael Booty You Mortimer Mooring H 21 minutes ago (8:40 AM)     HI      Good Morning Ms. Garcia Lauren Blackmon reviewed your holter and stated, rare benign PACs, but frequent but benign PVCs 1923 in 19 hrs (3.7% of the time). The monitor did read long enough. Recommend toprol 25 mg once daily. Follow up in 1-2 weeks after starting this.  Please let me know which pharmacy you would like this sent to and if morning or afternoon is good for a follow up appointment.      Thank you   Shara Alvarez

## 2022-02-04 ENCOUNTER — OFFICE VISIT (OUTPATIENT)
Dept: CARDIOLOGY CLINIC | Age: 72
End: 2022-02-04
Payer: MEDICARE

## 2022-02-04 VITALS
WEIGHT: 142 LBS | HEART RATE: 87 BPM | HEIGHT: 66 IN | DIASTOLIC BLOOD PRESSURE: 80 MMHG | SYSTOLIC BLOOD PRESSURE: 120 MMHG | OXYGEN SATURATION: 99 % | RESPIRATION RATE: 16 BRPM | BODY MASS INDEX: 22.82 KG/M2

## 2022-02-04 DIAGNOSIS — I49.3 FREQUENT PVCS: Primary | ICD-10-CM

## 2022-02-04 DIAGNOSIS — R00.2 HEART PALPITATIONS: ICD-10-CM

## 2022-02-04 DIAGNOSIS — Z87.891 HISTORY OF TOBACCO USE: ICD-10-CM

## 2022-02-04 PROCEDURE — G8510 SCR DEP NEG, NO PLAN REQD: HCPCS | Performed by: INTERNAL MEDICINE

## 2022-02-04 PROCEDURE — 1101F PT FALLS ASSESS-DOCD LE1/YR: CPT | Performed by: INTERNAL MEDICINE

## 2022-02-04 PROCEDURE — G8427 DOCREV CUR MEDS BY ELIG CLIN: HCPCS | Performed by: INTERNAL MEDICINE

## 2022-02-04 PROCEDURE — 3017F COLORECTAL CA SCREEN DOC REV: CPT | Performed by: INTERNAL MEDICINE

## 2022-02-04 PROCEDURE — G0463 HOSPITAL OUTPT CLINIC VISIT: HCPCS | Performed by: INTERNAL MEDICINE

## 2022-02-04 PROCEDURE — G8420 CALC BMI NORM PARAMETERS: HCPCS | Performed by: INTERNAL MEDICINE

## 2022-02-04 PROCEDURE — G8399 PT W/DXA RESULTS DOCUMENT: HCPCS | Performed by: INTERNAL MEDICINE

## 2022-02-04 PROCEDURE — 1090F PRES/ABSN URINE INCON ASSESS: CPT | Performed by: INTERNAL MEDICINE

## 2022-02-04 PROCEDURE — G8536 NO DOC ELDER MAL SCRN: HCPCS | Performed by: INTERNAL MEDICINE

## 2022-02-04 PROCEDURE — 99214 OFFICE O/P EST MOD 30 MIN: CPT | Performed by: INTERNAL MEDICINE

## 2022-02-04 PROCEDURE — G9899 SCRN MAM PERF RSLTS DOC: HCPCS | Performed by: INTERNAL MEDICINE

## 2022-02-04 NOTE — PROGRESS NOTES
[facility]: [facility]  [phone]    History of Present Illness:  Ms. Ann Hsu is a 65 yo F with a history of tobacco use, here for preop cardiac evaluation prior to septoplasty resection with Dr. Oralia Valencia, seen in the past for abnormal preop EKG. Her monitor did end up showing that she had benign ectopy that was associated with her palpitations, though these were frequent. On her monitor, she did have 1923 PVCs that was consistent with 3.7% of the time. She also had rare, but benign PACs. I did recommend trying Toprol. Since her visit, overall she has been doing okay. She does continue to have palpitations, but she does not really find these bothersome. We reviewed the monitor results in detail. She denies any exertional chest pain or shortness of breath. She is compensated on exam with clear lungs and no lower extremity edema. Assessment and Plan:   1. Frequent PVCs, palpitations. Although she is having these palpitations, she does not find them bothersome. I did recommend a trial of Toprol, for symptom relief. She wants to try to avoid medication if possible and as she is not having symptoms with this for now she will hold off. If in the future she starts having issues with this, she can always try Toprol. We did also talk about the possibility of ablation if she was symptomatic despite medical therapy. No additional cardiac evaluation is indicated at this time and she can follow here on an as needed basis. 2. History of tobacco use. She  has a past medical history of Adverse effect of anesthesia and Hyperlipidemia (7/9/2012). She has no past medical history of Abuse, Anemia NEC, Calculus of kidney, Congestive heart failure, unspecified, Contact dermatitis and other eczema, due to unspecified cause, COPD, Depression, Headache(784.0), Psychotic disorder ([facility] [address] or Trauma. All other systems negative except as above.      PE  Vitals:    02/04/22 1351   BP: 120/80   Pulse: 87   Resp: 16   SpO2: 99%   Weight: 142 lb (64.4 kg)   Height: 5' 6\" (1.676 m)    Body mass index is 22.92 kg/m².    General appearance - alert, well appearing, and in no distress  Mental status - affect appropriate to mood  Eyes - sclera anicteric, moist mucous membranes  Neck - supple, no JVD  Chest - clear to auscultation, no wheezes, rales or rhonchi  Heart - normal rate, regular rhythm, normal S1, S2, no murmurs, rubs, clicks or gallops  Abdomen - soft, nontender, nondistended, no masses or organomegaly  Neurological -no focal deficit  Extremities - peripheral pulses normal, no pedal edema    Recent Labs:  Lab Results   Component Value Date/Time    Cholesterol, total 292 (H) 2017 11:29 AM    Cholesterol, Total 303 (H) 2021 09:43 AM    HDL Cholesterol 88 2017 11:29 AM    LDL, calculated 187 (H) 2017 11:29 AM    Triglyceride 84 2017 11:29 AM     Lab Results   Component Value Date/Time    Creatinine 0.72 2021 09:43 AM     Lab Results   Component Value Date/Time    BUN 17 2021 09:43 AM     Lab Results   Component Value Date/Time    Potassium 4.3 2021 09:43 AM     Lab Results   Component Value Date/Time    Hemoglobin A1c, External 5.7 2020 12:00 AM     Lab Results   Component Value Date/Time    HGB 12.8 2021 09:43 AM     Lab Results   Component Value Date/Time    PLATELET 159  09:43 AM       Reviewed:  Past Medical History:   Diagnosis Date    Adverse effect of anesthesia     HARD TO WAKE    Hyperlipidemia 2012     Social History     Tobacco Use   Smoking Status Former Smoker    Packs/day: 0.25    Years: 21.00    Pack years: 5.25    Quit date: 1988    Years since quittin.1   Smokeless Tobacco Never Used     Social History     Substance and Sexual Activity   Alcohol Use Yes    Comment: ocassionally/ once per week     No Known Allergies    Current Outpatient Medications   Medication Sig    cholecalciferol (Vitamin D3) 25 mcg (1,000 unit) cap Take  by mouth daily.  ASCORBATE CALCIUM (VITAMIN C PO) Take 1,000 mg by mouth daily.  CALCIUM PO Take 600 mg by mouth daily.  metoprolol succinate (TOPROL-XL) 25 mg XL tablet Take 1 Tablet by mouth daily. (Patient not taking: Reported on 2/4/2022)     No current facility-administered medications for this visit.        Gilberto Bojorquez MD  [facility] and [facility] [place]  [address]

## 2022-03-19 PROBLEM — Z71.89 ACP (ADVANCE CARE PLANNING): Status: ACTIVE | Noted: 2017-04-12

## 2022-06-27 ENCOUNTER — OFFICE VISIT (OUTPATIENT)
Dept: FAMILY MEDICINE CLINIC | Age: 72
End: 2022-06-27
Payer: MEDICARE

## 2022-06-27 VITALS
HEIGHT: 66 IN | SYSTOLIC BLOOD PRESSURE: 130 MMHG | HEART RATE: 60 BPM | DIASTOLIC BLOOD PRESSURE: 80 MMHG | WEIGHT: 144.2 LBS | BODY MASS INDEX: 23.18 KG/M2

## 2022-06-27 DIAGNOSIS — E78.5 HYPERLIPIDEMIA, UNSPECIFIED HYPERLIPIDEMIA TYPE: ICD-10-CM

## 2022-06-27 DIAGNOSIS — Z00.00 ENCOUNTER FOR MEDICARE ANNUAL WELLNESS EXAM: Primary | ICD-10-CM

## 2022-06-27 DIAGNOSIS — L98.9 SKIN LESION OF HAND: ICD-10-CM

## 2022-06-27 DIAGNOSIS — E55.9 VITAMIN D DEFICIENCY: ICD-10-CM

## 2022-06-27 DIAGNOSIS — R09.82 PND (POST-NASAL DRIP): ICD-10-CM

## 2022-06-27 DIAGNOSIS — Z23 ENCOUNTER FOR IMMUNIZATION: ICD-10-CM

## 2022-06-27 PROCEDURE — G0463 HOSPITAL OUTPT CLINIC VISIT: HCPCS | Performed by: FAMILY MEDICINE

## 2022-06-27 PROCEDURE — 1101F PT FALLS ASSESS-DOCD LE1/YR: CPT | Performed by: FAMILY MEDICINE

## 2022-06-27 PROCEDURE — 1123F ACP DISCUSS/DSCN MKR DOCD: CPT | Performed by: FAMILY MEDICINE

## 2022-06-27 PROCEDURE — 90715 TDAP VACCINE 7 YRS/> IM: CPT | Performed by: FAMILY MEDICINE

## 2022-06-27 PROCEDURE — G9899 SCRN MAM PERF RSLTS DOC: HCPCS | Performed by: FAMILY MEDICINE

## 2022-06-27 PROCEDURE — G8420 CALC BMI NORM PARAMETERS: HCPCS | Performed by: FAMILY MEDICINE

## 2022-06-27 PROCEDURE — G8399 PT W/DXA RESULTS DOCUMENT: HCPCS | Performed by: FAMILY MEDICINE

## 2022-06-27 PROCEDURE — G0439 PPPS, SUBSEQ VISIT: HCPCS | Performed by: FAMILY MEDICINE

## 2022-06-27 PROCEDURE — 3017F COLORECTAL CA SCREEN DOC REV: CPT | Performed by: FAMILY MEDICINE

## 2022-06-27 PROCEDURE — 90471 IMMUNIZATION ADMIN: CPT | Performed by: FAMILY MEDICINE

## 2022-06-27 PROCEDURE — G8427 DOCREV CUR MEDS BY ELIG CLIN: HCPCS | Performed by: FAMILY MEDICINE

## 2022-06-27 PROCEDURE — G8432 DEP SCR NOT DOC, RNG: HCPCS | Performed by: FAMILY MEDICINE

## 2022-06-27 PROCEDURE — G8536 NO DOC ELDER MAL SCRN: HCPCS | Performed by: FAMILY MEDICINE

## 2022-06-27 NOTE — PROGRESS NOTES
This is the Subsequent Medicare Annual Wellness Exam, performed 12 months or more after the Initial AWV or the last Subsequent AWV    I have reviewed the patient's medical history in detail and updated the computerized patient record. Assessment/Plan   Education and counseling provided:  Are appropriate based on today's review and evaluation    1. Encounter for immunization  -     TDAP, BOOSTRIX, (AGE 10 YRS+), IM  2. Skin lesion of hand  -     TDAP, BOOSTRIX, (AGE 10 YRS+), IM       Depression Risk Factor Screening     3 most recent PHQ Screens 2/4/2022   Little interest or pleasure in doing things Not at all   Feeling down, depressed, irritable, or hopeless Not at all   Total Score PHQ 2 0       Alcohol & Drug Abuse Risk Screen    Do you average more than 1 drink per night or more than 7 drinks a week:  No    On any one occasion in the past three months have you have had more than 3 drinks containing alcohol:  No          Functional Ability and Level of Safety    Hearing: Hearing is good. Activities of Daily Living: The home contains: no safety equipment. Patient does total self care      Ambulation: with no difficulty     Fall Risk:  Fall Risk Assessment, last 12 mths 2/4/2022   Able to walk? Yes   Fall in past 12 months? 0   Do you feel unsteady?  0   Are you worried about falling 0      Abuse Screen:  Patient is not abused       Cognitive Screening    Has your family/caregiver stated any concerns about your memory: no         Health Maintenance Due     Health Maintenance Due   Topic Date Due    COVID-19 Vaccine (1) Never done    Pneumococcal 65+ years (2 - PCV) 06/26/2020    Shingrix Vaccine Age 50> (2 of 2) 11/18/2020    DTaP/Tdap/Td series (2 - Td or Tdap) 02/18/2021    Medicare Yearly Exam  09/23/2021       Patient Care Team   Patient Care Team:  Akbar Dalton MD as PCP - General (Family Medicine)  Akbar Dalton MD as PCP - REHABILITATION [facility]  Nitin Monte MD (Cardiovascular Disease Physician)  Bill So MD (Otolaryngology)    History     Patient Active Problem List   Diagnosis Code    Hyperlipidemia E78.5    ACP (advance care planning) Z71.89     Past Medical History:   Diagnosis Date    Adverse effect of anesthesia     HARD TO WAKE    Hyperlipidemia 2012      Past Surgical History:   Procedure Laterality Date    ENDOSCOPY, COLON, DIAGNOSTIC      polyp Follow up 2012; Dr. Jacoby Longoria HX HEENT  9/10    cholesteatoma removal and ruptured ear drum    HX HEENT      NASAL REPAIR    HX OOPHORECTOMY      HX TONSILLECTOMY       Current Outpatient Medications   Medication Sig Dispense Refill    metoprolol succinate (TOPROL-XL) 25 mg XL tablet Take 1 Tablet by mouth daily. (Patient not taking: Reported on 2022) 30 Tablet 5    cholecalciferol (Vitamin D3) 25 mcg (1,000 unit) cap Take  by mouth daily.  ASCORBATE CALCIUM (VITAMIN C PO) Take 1,000 mg by mouth daily.  CALCIUM PO Take 600 mg by mouth daily.        No Known Allergies    Family History   Problem Relation Age of Onset    Hypertension Mother     Cancer Maternal Aunt         breast    Breast Cancer Maternal Aunt         66's    No Known Problems Sister     No Known Problems Sister     No Known Problems Daughter     No Known Problems Daughter     Heart Disease Maternal Grandmother         PACEMAKER    Anesth Problems Neg Hx     Asthma Neg Hx     Diabetes Neg Hx      Social History     Tobacco Use    Smoking status: Former Smoker     Packs/day: 0.25     Years: 21.00     Pack years: 5.25     Quit date: 1988     Years since quittin.5    Smokeless tobacco: Never Used   Substance Use Topics    Alcohol use: Yes     Comment: ocassionally/ once per week         Jr Escamilla MD

## 2022-06-28 LAB
25(OH)D3 SERPL-MCNC: 53.7 NG/ML (ref 30–100)
ALBUMIN SERPL-MCNC: 3.7 G/DL (ref 3.5–5)
ALBUMIN/GLOB SERPL: 1.3 {RATIO} (ref 1.1–2.2)
ALP SERPL-CCNC: 84 U/L (ref 45–117)
ALT SERPL-CCNC: 19 U/L (ref 12–78)
ANION GAP SERPL CALC-SCNC: 3 MMOL/L (ref 5–15)
AST SERPL-CCNC: 17 U/L (ref 15–37)
BILIRUB SERPL-MCNC: 0.3 MG/DL (ref 0.2–1)
BUN SERPL-MCNC: 22 MG/DL (ref 6–20)
BUN/CREAT SERPL: 21 (ref 12–20)
CALCIUM SERPL-MCNC: 8.9 MG/DL (ref 8.5–10.1)
CHLORIDE SERPL-SCNC: 107 MMOL/L (ref 97–108)
CHOLEST SERPL-MCNC: 327 MG/DL (ref 100–199)
CO2 SERPL-SCNC: 30 MMOL/L (ref 21–32)
CREAT SERPL-MCNC: 1.05 MG/DL (ref 0.55–1.02)
GLOBULIN SER CALC-MCNC: 2.9 G/DL (ref 2–4)
GLUCOSE SERPL-MCNC: 97 MG/DL (ref 65–100)
HDL SERPL-SCNC: 34.4 UMOL/L
HDLC SERPL-MCNC: 95 MG/DL
LDL SERPL QN: 22.4 NM
LDL SERPL-SCNC: 1758 NMOL/L
LDL SMALL SERPL-SCNC: <90 NMOL/L
LDLC SERPL CALC-MCNC: 214 MG/DL (ref 0–99)
LP-IR SCORE SERPL: <25
POTASSIUM SERPL-SCNC: 4.9 MMOL/L (ref 3.5–5.1)
PROT SERPL-MCNC: 6.6 G/DL (ref 6.4–8.2)
SODIUM SERPL-SCNC: 140 MMOL/L (ref 136–145)
TRIGL SERPL-MCNC: 106 MG/DL (ref 0–149)

## 2022-09-26 ENCOUNTER — TELEPHONE (OUTPATIENT)
Dept: FAMILY MEDICINE CLINIC | Age: 72
End: 2022-09-26

## 2022-09-26 NOTE — TELEPHONE ENCOUNTER
Pt dropped off a copy of updated vaccinations.   Immunizations have been scanned into pt chart and put into provider's mailbox

## 2023-11-01 ENCOUNTER — TELEPHONE (OUTPATIENT)
Age: 73
End: 2023-11-01

## 2023-11-01 NOTE — TELEPHONE ENCOUNTER
No, he has advised if anyone wants to change he is fine with it. He understands as he is so limited.     ----- Message from Raymundo Monteiro sent at 11/1/2023 10:40 AM EDT -----  Subject: Appointment Request    Reason for Call: Established Patient Appointment needed: Routine Medicare   AW    QUESTIONS    Reason for appointment request? Available appointments did not meet   patient need     Additional Information for Provider? Patient needs a yearly exam in office   and not on a Monday. Patient would like to be seen this yearly or January   and if unable to be seen she would like to move to a different doctor.  ---------------------------------------------------------------------------  --------------  CALL BACK INFO  [phone]; OK to leave message on voicemail  ---------------------------------------------------------------------------  --------------  SCRIPT ANSWERS

## 2024-04-17 ENCOUNTER — OFFICE VISIT (OUTPATIENT)
Age: 74
End: 2024-04-17
Payer: MEDICARE

## 2024-04-17 VITALS
DIASTOLIC BLOOD PRESSURE: 87 MMHG | TEMPERATURE: 98.4 F | SYSTOLIC BLOOD PRESSURE: 137 MMHG | WEIGHT: 147 LBS | HEART RATE: 87 BPM | OXYGEN SATURATION: 97 % | HEIGHT: 66 IN | BODY MASS INDEX: 23.63 KG/M2

## 2024-04-17 DIAGNOSIS — E78.2 MIXED HYPERLIPIDEMIA: ICD-10-CM

## 2024-04-17 DIAGNOSIS — H65.92 LEFT NON-SUPPURATIVE OTITIS MEDIA: Primary | ICD-10-CM

## 2024-04-17 PROCEDURE — G2211 COMPLEX E/M VISIT ADD ON: HCPCS | Performed by: STUDENT IN AN ORGANIZED HEALTH CARE EDUCATION/TRAINING PROGRAM

## 2024-04-17 PROCEDURE — 99214 OFFICE O/P EST MOD 30 MIN: CPT | Performed by: STUDENT IN AN ORGANIZED HEALTH CARE EDUCATION/TRAINING PROGRAM

## 2024-04-17 PROCEDURE — 1123F ACP DISCUSS/DSCN MKR DOCD: CPT | Performed by: STUDENT IN AN ORGANIZED HEALTH CARE EDUCATION/TRAINING PROGRAM

## 2024-04-17 RX ORDER — OFLOXACIN 3 MG/ML
5 SOLUTION AURICULAR (OTIC) 2 TIMES DAILY
Qty: 3.5 ML | Refills: 0 | Status: SHIPPED | OUTPATIENT
Start: 2024-04-17 | End: 2024-04-24

## 2024-04-17 RX ORDER — OFLOXACIN 3 MG/ML
1 SOLUTION/ DROPS OPHTHALMIC 4 TIMES DAILY
Qty: 5 ML | Refills: 0 | Status: SHIPPED | OUTPATIENT
Start: 2024-04-17 | End: 2024-04-17

## 2024-04-17 SDOH — ECONOMIC STABILITY: FOOD INSECURITY: WITHIN THE PAST 12 MONTHS, THE FOOD YOU BOUGHT JUST DIDN'T LAST AND YOU DIDN'T HAVE MONEY TO GET MORE.: NEVER TRUE

## 2024-04-17 SDOH — ECONOMIC STABILITY: INCOME INSECURITY: HOW HARD IS IT FOR YOU TO PAY FOR THE VERY BASICS LIKE FOOD, HOUSING, MEDICAL CARE, AND HEATING?: NOT HARD AT ALL

## 2024-04-17 SDOH — ECONOMIC STABILITY: HOUSING INSECURITY
IN THE LAST 12 MONTHS, WAS THERE A TIME WHEN YOU DID NOT HAVE A STEADY PLACE TO SLEEP OR SLEPT IN A SHELTER (INCLUDING NOW)?: NO

## 2024-04-17 SDOH — ECONOMIC STABILITY: FOOD INSECURITY: WITHIN THE PAST 12 MONTHS, YOU WORRIED THAT YOUR FOOD WOULD RUN OUT BEFORE YOU GOT MONEY TO BUY MORE.: NEVER TRUE

## 2024-04-17 ASSESSMENT — PATIENT HEALTH QUESTIONNAIRE - PHQ9
1. LITTLE INTEREST OR PLEASURE IN DOING THINGS: NOT AT ALL
SUM OF ALL RESPONSES TO PHQ QUESTIONS 1-9: 0

## 2024-04-17 NOTE — PROGRESS NOTES
[facility] PRACTICE      Chief Complaint:     Chief Complaint   Patient presents with    Ear Fullness     Left ear ache. Travelling down ear. Fullness. Hurts when coughing. Mild but progressive pinching pain.        Susan Lomeli is a 73 y.o. female that presents for: ear pain      Assessment/Plan:     Susan was seen today for ear fullness.    Diagnoses and all orders for this visit:    Left non-suppurative otitis media  -     Discontinue: ofloxacin (OCUFLOX) 0.3 % solution; Place 1 drop into the left eye 4 times daily for 7 days  -     ofloxacin (FLOXIN) 0.3 % otic solution; Place 5 drops in ear(s) 2 times daily for 7 days           Follow up:     Return if symptoms worsen or fail to improve.    Subjective:   HPI:  Susan Lomeli is a 73 y.o. female that presents for:    CC:  Left ear ache. Travelling down ear. Fullness. Hurts when coughing. Mild but progressive pinching pain. X a few days. No URI or allergy sxs. Has had perforation/ procedure done on that side. No fevers or chills.        Health Maintenance:  Health Maintenance Due   Topic Date Due    COVID-19 Vaccine (1) Never done    Respiratory Syncytial Virus (RSV) Pregnant or age 60 yrs+ (1 - 1-dose 60+ series) Never done    A1C test (Diabetic or Prediabetic)  09/04/2021    Breast cancer screen  10/08/2022    Depression Screen  06/27/2023        ROS:   See HPI      Past medical history, social history, and medications personally reviewed.      Allergies personally reviewed.  No Known Allergies       Objective:   Vitals reviewed.  /87 (Site: Right Upper Arm, Position: Sitting, Cuff Size: Small Adult)   Pulse 87   Temp 98.4 °F (36.9 °C) (Temporal)   Ht 1.676 m (5' 6\")   Wt 66.7 kg (147 lb)   SpO2 97%   BMI 23.73 kg/m²      Wt Readings from Last 3 Encounters:   04/17/24 66.7 kg (147 lb)   06/27/22 65.4 kg (144 lb 3.2 oz)   02/04/22 64.4 kg (142 lb)        Physical Exam    Vitals: Reviewed.   General: AO x 3. No distress. Not

## 2024-04-17 NOTE — PROGRESS NOTES
Chief Complaint   Patient presents with    Ear Fullness     Left ear ache. Travelling down ear. Fullness. Hurts when coughing. Mild but progressive pinching pain.      \"Have you been to the ER, urgent care clinic since your last visit?  Hospitalized since your last visit?\"    NO    “Have you seen or consulted any other health care providers outside of [facility] since your last visit?”    NO       Have you had a mammogram?”   NO    Date of last Mammogram: 10/8/2020                 6/27/2022     4:19 PM   PHQ-9    Little interest or pleasure in doing things 0   Feeling down, depressed, or hopeless 0   Trouble falling or staying asleep, or sleeping too much 0   Feeling tired or having little energy 0   Poor appetite or overeating 0   Feeling bad about yourself - or that you are a failure or have let yourself or your family down 0   Trouble concentrating on things, such as reading the newspaper or watching television 0   Moving or speaking so slowly that other people could have noticed. Or the opposite - being so fidgety or restless that you have been moving around a lot more than usual 0   PHQ-2 Score 0   PHQ-9 Total Score 0   How difficult have these problems made it for you to do your work, take care of your home and get along with others Not difficult at all           Financial Resource Strain: Not on file      Food Insecurity: Not on file          Health Maintenance Due   Topic Date Due    COVID-19 Vaccine (1) Never done    Respiratory Syncytial Virus (RSV) Pregnant or age 60 yrs+ (1 - 1-dose 60+ series) Never done    A1C test (Diabetic or Prediabetic)  09/04/2021    Breast cancer screen  10/08/2022    Depression Screen  06/27/2023

## 2024-05-06 ENCOUNTER — HOSPITAL ENCOUNTER (OUTPATIENT)
Facility: HOSPITAL | Age: 74
Discharge: HOME | End: 2024-05-09
Attending: FAMILY MEDICINE
Payer: MEDICARE

## 2024-05-06 DIAGNOSIS — Z12.31 VISIT FOR SCREENING MAMMOGRAM: ICD-10-CM

## 2024-05-06 PROCEDURE — 77063 BREAST TOMOSYNTHESIS BI: CPT

## 2024-06-05 ENCOUNTER — HOSPITAL ENCOUNTER (OUTPATIENT)
Facility: HOSPITAL | Age: 74
Setting detail: OUTPATIENT SURGERY
Discharge: HOME | End: 2024-06-05
Attending: INTERNAL MEDICINE | Admitting: INTERNAL MEDICINE
Payer: MEDICARE

## 2024-06-05 ENCOUNTER — ANESTHESIA EVENT (OUTPATIENT)
Facility: HOSPITAL | Age: 74
End: 2024-06-05
Payer: MEDICARE

## 2024-06-05 ENCOUNTER — ANESTHESIA (OUTPATIENT)
Facility: HOSPITAL | Age: 74
End: 2024-06-05
Payer: MEDICARE

## 2024-06-05 VITALS
HEIGHT: 66 IN | RESPIRATION RATE: 17 BRPM | WEIGHT: 141.3 LBS | BODY MASS INDEX: 22.71 KG/M2 | SYSTOLIC BLOOD PRESSURE: 119 MMHG | OXYGEN SATURATION: 98 % | DIASTOLIC BLOOD PRESSURE: 78 MMHG | HEART RATE: 63 BPM | TEMPERATURE: 98.4 F

## 2024-06-05 PROCEDURE — 7100000011 HC PHASE II RECOVERY - ADDTL 15 MIN: Performed by: INTERNAL MEDICINE

## 2024-06-05 PROCEDURE — 3600007512: Performed by: INTERNAL MEDICINE

## 2024-06-05 PROCEDURE — 2709999900 HC NON-CHARGEABLE SUPPLY: Performed by: INTERNAL MEDICINE

## 2024-06-05 PROCEDURE — 3700000000 HC ANESTHESIA ATTENDED CARE: Performed by: INTERNAL MEDICINE

## 2024-06-05 PROCEDURE — 7100000010 HC PHASE II RECOVERY - FIRST 15 MIN: Performed by: INTERNAL MEDICINE

## 2024-06-05 PROCEDURE — 3600007502: Performed by: INTERNAL MEDICINE

## 2024-06-05 PROCEDURE — 6360000002 HC RX W HCPCS: Performed by: NURSE ANESTHETIST, CERTIFIED REGISTERED

## 2024-06-05 PROCEDURE — 2580000003 HC RX 258: Performed by: NURSE ANESTHETIST, CERTIFIED REGISTERED

## 2024-06-05 PROCEDURE — 3700000001 HC ADD 15 MINUTES (ANESTHESIA): Performed by: INTERNAL MEDICINE

## 2024-06-05 PROCEDURE — 88305 TISSUE EXAM BY PATHOLOGIST: CPT

## 2024-06-05 RX ORDER — SODIUM CHLORIDE 9 MG/ML
25 INJECTION, SOLUTION INTRAVENOUS PRN
Status: DISCONTINUED | OUTPATIENT
Start: 2024-06-05 | End: 2024-06-05 | Stop reason: HOSPADM

## 2024-06-05 RX ORDER — SODIUM CHLORIDE 0.9 % (FLUSH) 0.9 %
5-40 SYRINGE (ML) INJECTION EVERY 12 HOURS SCHEDULED
Status: DISCONTINUED | OUTPATIENT
Start: 2024-06-05 | End: 2024-06-05 | Stop reason: HOSPADM

## 2024-06-05 RX ORDER — SODIUM CHLORIDE 0.9 % (FLUSH) 0.9 %
5-40 SYRINGE (ML) INJECTION PRN
Status: DISCONTINUED | OUTPATIENT
Start: 2024-06-05 | End: 2024-06-05 | Stop reason: HOSPADM

## 2024-06-05 RX ORDER — SODIUM CHLORIDE 9 MG/ML
INJECTION, SOLUTION INTRAVENOUS CONTINUOUS PRN
Status: DISCONTINUED | OUTPATIENT
Start: 2024-06-05 | End: 2024-06-05 | Stop reason: SDUPTHER

## 2024-06-05 RX ORDER — SODIUM CHLORIDE 9 MG/ML
INJECTION, SOLUTION INTRAVENOUS CONTINUOUS
Status: DISCONTINUED | OUTPATIENT
Start: 2024-06-05 | End: 2024-06-05 | Stop reason: HOSPADM

## 2024-06-05 RX ADMIN — PROPOFOL 140 MG: 10 INJECTION, EMULSION INTRAVENOUS at 14:13

## 2024-06-05 RX ADMIN — SODIUM CHLORIDE: 9 INJECTION, SOLUTION INTRAVENOUS at 14:10

## 2024-06-05 RX ADMIN — PROPOFOL 30 MG: 10 INJECTION, EMULSION INTRAVENOUS at 14:16

## 2024-06-05 RX ADMIN — PROPOFOL 30 MG: 10 INJECTION, EMULSION INTRAVENOUS at 14:14

## 2024-06-05 RX ADMIN — PROPOFOL 30 MG: 10 INJECTION, EMULSION INTRAVENOUS at 14:22

## 2024-06-05 RX ADMIN — PROPOFOL 30 MG: 10 INJECTION, EMULSION INTRAVENOUS at 14:18

## 2024-06-05 ASSESSMENT — PAIN - FUNCTIONAL ASSESSMENT: PAIN_FUNCTIONAL_ASSESSMENT: NONE - DENIES PAIN

## 2024-06-05 NOTE — OP NOTE
colon - removed and sent for pathology  -Moderate sigmoid colon diverticulosis and small internal hemorrhoids.     Recommendations:   -Resume normal medication(s).  -A high fiber diet with plenty of fluids (up to 8 glasses of water daily) is suggested to relieve these symptoms.  Metamucil, 1 tablespoon once or twice daily can be used to keep bowels regular if needed.  -Await pathology.  -If adenoma is present, repeat colonoscopy in 3 years.      Discharge Disposition:  Home in the company of a  when able to ambulate.    Ly Moreland MD  6/5/2024  2:38 PM

## 2024-06-05 NOTE — ANESTHESIA POSTPROCEDURE EVALUATION
Department of Anesthesiology  Postprocedure Note    Patient: Susan Lomeli  MRN: [id number]  YOB: 1950  Date of evaluation: 6/5/2024    Procedure Summary       Date: 06/05/24 Room / Location: [place] [address] / [facility] ENDOSCOPY    Anesthesia Start: 1407 Anesthesia Stop: 1440    Procedure: COLONOSCOPY BIOPSY (Upper GI Region) Diagnosis:       Personal history of colonic polyps      (Personal history of colonic polyps [Z86.010])    Surgeons: Ly Moreland MD Responsible Provider: Sameera Glaser DO    Anesthesia Type: MAC ASA Status: 2            Anesthesia Type: MAC    Serafin Phase I: Serafin Score: 10    Serafin Phase II: Serafin Score: 10    Anesthesia Post Evaluation    Patient location during evaluation: PACU  Level of consciousness: awake  Airway patency: patent  Nausea & Vomiting: no nausea  Cardiovascular status: hemodynamically stable  Respiratory status: acceptable  Hydration status: stable  Multimodal analgesia pain management approach  Pain management: adequate    No notable events documented.

## 2024-06-05 NOTE — DISCHARGE INSTRUCTIONS
[facility] [address] [address]          Susan Lomeli  [id number]  1950    COLON DISCHARGE INSTRUCTIONS    DISCOMFORT:  Redness at IV site- apply warm compress to area; if redness or soreness persist- contact your physician  There may be a slight amount of blood passed from the rectum  Gaseous discomfort- walking, belching will help relieve any discomfort    DIET:   High Fiber diet.     ACTIVITY:  You may resume your normal daily activities it is recommended that you spend the remainder of the day resting -  avoid any strenuous activity.  You may not operate a vehicle for 12 hours  You may not engage in an occupation involving machinery or appliances for rest of today  You may not drink alcoholic beverages for at least 12 hours  Avoid making any critical decisions for at least 24 hour    CALL M.D.  ANY SIGN OF:   Increasing pain, nausea, vomiting  Abdominal distension (swelling)  New increased bleeding (oral or rectal)  Fever (chills)  Pain in chest area  Bloody discharge from nose or mouth  Shortness of breath     Follow-up Instructions:   Call Dr. Ly Moreland for any questions or problems.   Telephone # [phone]  Biopsy results will be available in  5 to 7 days    Impression:  -Fair prep  -Total 8 polyps, 3-6 mm, found in the sigmoid and ascending colon - removed and sent for pathology  -Moderate sigmoid colon diverticulosis and small internal hemorrhoids.     Recommendations:   -Resume normal medication(s).  -A high fiber diet with plenty of fluids (up to 8 glasses of water daily) is suggested to relieve these symptoms.  Metamucil, 1 tablespoon once or twice daily can be used to keep bowels regular if needed.  -Await pathology.  -If adenoma is present, repeat colonoscopy in 3 years.      Ly Moreland MD       High-Fiber Diet: Care Instructions  Overview     A high-fiber diet may help you relieve constipation and feel less bloated.  Your doctor and dietitian

## 2024-06-05 NOTE — H&P
[facility] [address] [id number] [phone]        History and Physical     NAME:  Susan Lomeli   :  1950   MRN:  [id number]         HPI:  Susan Lomeli is a 73 y.o. female here for colonoscopy. Patient has history of colon polyps. Last colonoscopy 3 years back. No family history of colon cancer. No GI symptoms.     Past Surgical History:   Procedure Laterality Date     SECTION      X2    COLONOSCOPY      polyp Follow up 2012; Dr. Ajay COX      NASAL REPAIR    KENNY  9/10    cholesteatoma removal and ruptured ear drum    OVARY REMOVAL      TONSILLECTOMY       Past Medical History:   Diagnosis Date    Adverse effect of anesthesia     HARD TO WAKE    Hyperlipidemia 2012     Social History     Tobacco Use    Smoking status: Former     Current packs/day: 0.00     Types: Cigarettes     Quit date: 1988     Years since quittin.4    Smokeless tobacco: Never   Vaping Use    Vaping Use: Never used   Substance Use Topics    Alcohol use: Yes     Alcohol/week: 1.0 standard drink of alcohol     Types: 1 Glasses of wine per week     Comment: I glass per week    Drug use: No     No current facility-administered medications on file prior to encounter.     Current Outpatient Medications on File Prior to Encounter   Medication Sig Dispense Refill    CALCIUM PO Take by mouth daily      vitamin D 25 MCG (1000 UT) CAPS Take by mouth daily       No Known Allergies  Family History   Problem Relation Age of Onset    Asthma Neg Hx     Anesth Problems Neg Hx     Heart Disease Maternal Grandmother         PACEMAKER    Hypertension Mother     Cancer Maternal Aunt         breast    Diabetes Neg Hx     No Known Problems Sister     No Known Problems Sister     No Known Problems Daughter     No Known Problems Daughter     Breast Cancer Maternal Aunt         70's     Current Facility-Administered Medications   Medication Dose Route Frequency    0.9 %

## 2024-06-05 NOTE — PROGRESS NOTES

## 2024-06-05 NOTE — ANESTHESIA PRE PROCEDURE
Department of Anesthesiology  Preprocedure Note       Name:  Susan Lomeli   Age:  73 y.o.  :  1950                                          MRN:  [id number]         Date:  2024      Surgeon: Surgeon(s):  Ly Moreland MD    Procedure: Procedure(s):  COLONOSCOPY BIOPSY    Medications prior to admission:   Prior to Admission medications    Medication Sig Start Date End Date Taking? Authorizing Provider   CALCIUM PO Take by mouth daily    Automatic Reconciliation, Ar   vitamin D 25 MCG (1000 UT) CAPS Take by mouth daily    Automatic Reconciliation, Ar       Current medications:    Current Facility-Administered Medications   Medication Dose Route Frequency Provider Last Rate Last Admin    0.9 % sodium chloride infusion   IntraVENous Continuous Ly Moreland MD        sodium chloride flush 0.9 % injection 5-40 mL  5-40 mL IntraVENous 2 times per day Ly Moreland MD        sodium chloride flush 0.9 % injection 5-40 mL  5-40 mL IntraVENous PRN Ly Moreland MD        0.9 % sodium chloride infusion  25 mL IntraVENous PRN Ly Moreland MD           Allergies:  No Known Allergies    Problem List:    Patient Active Problem List   Diagnosis Code    Hyperlipidemia E78.5    ACP (advance care planning) Z71.89       Past Medical History:        Diagnosis Date    Adverse effect of anesthesia     HARD TO WAKE    Hyperlipidemia 2012       Past Surgical History:        Procedure Laterality Date     SECTION      X2    COLONOSCOPY      polyp Follow up 2012; Dr. Ajay COX      NASAL REPAIR    KENNY  9/10    cholesteatoma removal and ruptured ear drum    OVARY REMOVAL      TONSILLECTOMY         Social History:    Social History     Tobacco Use    Smoking status: Former     Current packs/day: 0.00     Types: Cigarettes     Quit date: 1988     Years since quittin.4    Smokeless tobacco: Never   Substance Use Topics    Alcohol use: Yes     Alcohol/week: 1.0 standard drink of alcohol

## 2024-06-21 SDOH — HEALTH STABILITY: PHYSICAL HEALTH: ON AVERAGE, HOW MANY MINUTES DO YOU ENGAGE IN EXERCISE AT THIS LEVEL?: 60 MIN

## 2024-06-21 SDOH — HEALTH STABILITY: PHYSICAL HEALTH: ON AVERAGE, HOW MANY DAYS PER WEEK DO YOU ENGAGE IN MODERATE TO STRENUOUS EXERCISE (LIKE A BRISK WALK)?: 7 DAYS

## 2024-06-21 ASSESSMENT — PATIENT HEALTH QUESTIONNAIRE - PHQ9
SUM OF ALL RESPONSES TO PHQ QUESTIONS 1-9: 0
SUM OF ALL RESPONSES TO PHQ9 QUESTIONS 1 & 2: 0
2. FEELING DOWN, DEPRESSED OR HOPELESS: NOT AT ALL
1. LITTLE INTEREST OR PLEASURE IN DOING THINGS: NOT AT ALL
SUM OF ALL RESPONSES TO PHQ QUESTIONS 1-9: 0

## 2024-06-21 ASSESSMENT — LIFESTYLE VARIABLES
HOW MANY STANDARD DRINKS CONTAINING ALCOHOL DO YOU HAVE ON A TYPICAL DAY: 1 OR 2
HOW OFTEN DO YOU HAVE A DRINK CONTAINING ALCOHOL: 3
HOW OFTEN DO YOU HAVE SIX OR MORE DRINKS ON ONE OCCASION: 1
HOW OFTEN DO YOU HAVE A DRINK CONTAINING ALCOHOL: 2-4 TIMES A MONTH
HOW MANY STANDARD DRINKS CONTAINING ALCOHOL DO YOU HAVE ON A TYPICAL DAY: 1

## 2024-06-24 ENCOUNTER — OFFICE VISIT (OUTPATIENT)
Age: 74
End: 2024-06-24
Payer: MEDICARE

## 2024-06-24 VITALS
HEART RATE: 76 BPM | HEIGHT: 66 IN | SYSTOLIC BLOOD PRESSURE: 144 MMHG | BODY MASS INDEX: 23.14 KG/M2 | RESPIRATION RATE: 16 BRPM | OXYGEN SATURATION: 96 % | WEIGHT: 144 LBS | DIASTOLIC BLOOD PRESSURE: 82 MMHG | TEMPERATURE: 97.5 F

## 2024-06-24 DIAGNOSIS — M48.02 FORAMINAL STENOSIS OF CERVICAL REGION: ICD-10-CM

## 2024-06-24 DIAGNOSIS — E78.2 MIXED HYPERLIPIDEMIA: Primary | ICD-10-CM

## 2024-06-24 DIAGNOSIS — R53.83 MALAISE AND FATIGUE: ICD-10-CM

## 2024-06-24 DIAGNOSIS — H04.123 DRY EYES, BILATERAL: ICD-10-CM

## 2024-06-24 DIAGNOSIS — I83.93 VARICOSE VEINS OF BOTH LOWER EXTREMITIES, UNSPECIFIED WHETHER COMPLICATED: ICD-10-CM

## 2024-06-24 DIAGNOSIS — M54.2 CERVICALGIA: ICD-10-CM

## 2024-06-24 DIAGNOSIS — R53.81 MALAISE AND FATIGUE: ICD-10-CM

## 2024-06-24 DIAGNOSIS — E55.9 VITAMIN D DEFICIENCY, UNSPECIFIED: ICD-10-CM

## 2024-06-24 DIAGNOSIS — R73.09 ELEVATED GLUCOSE: ICD-10-CM

## 2024-06-24 DIAGNOSIS — R51.9 FACIAL PAIN: ICD-10-CM

## 2024-06-24 DIAGNOSIS — Z00.00 ENCOUNTER FOR SUBSEQUENT ANNUAL WELLNESS VISIT (AWV) IN MEDICARE PATIENT: ICD-10-CM

## 2024-06-24 PROCEDURE — 1123F ACP DISCUSS/DSCN MKR DOCD: CPT | Performed by: FAMILY MEDICINE

## 2024-06-24 PROCEDURE — G8427 DOCREV CUR MEDS BY ELIG CLIN: HCPCS | Performed by: FAMILY MEDICINE

## 2024-06-24 PROCEDURE — G8399 PT W/DXA RESULTS DOCUMENT: HCPCS | Performed by: FAMILY MEDICINE

## 2024-06-24 PROCEDURE — 3017F COLORECTAL CA SCREEN DOC REV: CPT | Performed by: FAMILY MEDICINE

## 2024-06-24 PROCEDURE — 99213 OFFICE O/P EST LOW 20 MIN: CPT | Performed by: FAMILY MEDICINE

## 2024-06-24 PROCEDURE — 1036F TOBACCO NON-USER: CPT | Performed by: FAMILY MEDICINE

## 2024-06-24 PROCEDURE — G8420 CALC BMI NORM PARAMETERS: HCPCS | Performed by: FAMILY MEDICINE

## 2024-06-24 PROCEDURE — G0439 PPPS, SUBSEQ VISIT: HCPCS | Performed by: FAMILY MEDICINE

## 2024-06-24 PROCEDURE — 1090F PRES/ABSN URINE INCON ASSESS: CPT | Performed by: FAMILY MEDICINE

## 2024-06-24 RX ORDER — AZELASTINE 1 MG/ML
1 SPRAY, METERED NASAL NIGHTLY
COMMUNITY

## 2024-06-24 ASSESSMENT — ENCOUNTER SYMPTOMS
BACK PAIN: 0
NAUSEA: 0
COUGH: 0
SHORTNESS OF BREATH: 0
VOMITING: 0

## 2024-06-24 NOTE — PROGRESS NOTES
HPI  Susan Lomeli 73 y.o. female  presents to the office today for AWV and follow up on chronic conditions.     Blood pressure (!) 144/82, pulse 76, temperature 97.5 °F (36.4 °C), temperature source Temporal, resp. rate 16, height 1.676 m (5' 6\"), weight 65.3 kg (144 lb), SpO2 96 %. Body mass index is 23.24 kg/m².   Chief Complaint   Patient presents with    Medicare AWV      Pt has felt pressure around her eyes and sometimes has trouble keeping her eyes open. These symptoms have been present for about 5 years. She also reports dry eyes and feeling of sand in eyes. She has been evaluated by ophthalmologist, Dr. Medellin for this and was told her eyes are not dry. She denies headaches, eye pain, or neck pain. She has some normal arthritis pain, but this has been stable. She is concerned her symptoms could be related to a serious condition. 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 She hasn't seen neurologist in several years. MRI of c-spine in 2021 showed, multilevel degenerative change most significant at C4-5 and C5-6.     Pt c/o worsening varicose veins. She has noticed her ankles appear darker. She saw vascular specialist who recommended compression socks. She denies pain of varicose veins.     She last saw her dermatologist 6 months ago. She had squamous cell removed from face.     Vitamin D deficiency: Pt's vitamin D was WNL in 2022. Pt continues with vitamin D 1000 units daily.    Current Outpatient Medications   Medication Sig Dispense Refill    azelastine (ASTELIN) 0.1 % nasal spray 1 spray by Nasal route at bedtime Use in each nostril as directed as needed      CALCIUM PO Take by mouth daily      vitamin D 25 MCG (1000 UT) CAPS Take by mouth daily       No current facility-administered medications for this visit.     No Known Allergies  Past Medical History:   Diagnosis Date    Adverse effect of anesthesia     HARD TO WAKE    Hyperlipidemia 7/9/2012

## 2024-06-24 NOTE — PROGRESS NOTES
Medicare Annual Wellness Visit    Susan Lomeli is here for Medicare AWV    Assessment & Plan   Mixed hyperlipidemia  -     Comprehensive Metabolic Panel; Future  -     Lipid Panel; Future  Vitamin D deficiency, unspecified  -     Vitamin D 25 Hydroxy; Future  Encounter for subsequent annual wellness visit (AWV) in Medicare patient  Malaise and fatigue  -     CBC with Auto Differential; Future  -     TSH; Future  -     T4, Free; Future  Dry eyes, bilateral  -     Sjogren's Ab (SS-A, SS-B); Future  Elevated glucose  -     Hemoglobin A1C; Future  Foraminal stenosis of cervical region  Cervicalgia  -     [facility] Eufemia Hinton MD, Neurology, [place]  Facial pain  -     [facility] Eufemia Hinton MD, Neurology, [place]  Varicose veins of both lower extremities, unspecified whether complicated    Recommendations for Preventive Services Due: see orders and patient instructions/AVS.  Recommended screening schedule for the next 5-10 years is provided to the patient in written form: see Patient Instructions/AVS.     Return in about 1 year (around 6/24/2025).     Subjective       Patient's complete Health Risk Assessment and screening values have been reviewed and are found in Flowsheets. The following problems were reviewed today and where indicated follow up appointments were made and/or referrals ordered.    Positive Risk Factor Screenings with Interventions:                    Safety:  Do you have non-slip mats or non-slip surfaces or shower bars or grab bars in your shower or bathtub?: (!) No  Interventions:  Patient declined any further interventions or treatment     Advanced Directives:  Do you have a Living Will?: (!) No    Intervention:  has NO advanced directive - information provided                     Objective   Vitals:    06/24/24 1322   BP: (!) 144/82   Site: Left Upper Arm   Position: Sitting   Cuff Size: Small Adult   Pulse: 76   Resp: 16   Temp: 97.5 °F (36.4 °C)   TempSrc: Temporal   SpO2: 96%   Weight: 65.3

## 2024-06-24 NOTE — PROGRESS NOTES
Chief Complaint   Patient presents with    Medicare AWV     \"Have you been to the ER, urgent care clinic since your last visit?  Hospitalized since your last visit?\"    Colonoscopy    “Have you seen or consulted any other health care providers outside of [facility] since your last visit?”    Eye Exam   Derm -   Podiatry           Click Here for Release of Records Request

## 2024-06-25 LAB
25(OH)D3 SERPL-MCNC: 52.4 NG/ML (ref 30–100)
ALBUMIN SERPL-MCNC: 3.9 G/DL (ref 3.5–5)
ALBUMIN/GLOB SERPL: 1.3 (ref 1.1–2.2)
ALP SERPL-CCNC: 102 U/L (ref 45–117)
ALT SERPL-CCNC: 20 U/L (ref 12–78)
ANION GAP SERPL CALC-SCNC: 3 MMOL/L (ref 5–15)
AST SERPL-CCNC: 18 U/L (ref 15–37)
BASOPHILS # BLD: 0 K/UL (ref 0–0.1)
BASOPHILS NFR BLD: 1 % (ref 0–1)
BILIRUB SERPL-MCNC: 0.4 MG/DL (ref 0.2–1)
BUN SERPL-MCNC: 17 MG/DL (ref 6–20)
BUN/CREAT SERPL: 21 (ref 12–20)
CALCIUM SERPL-MCNC: 9.6 MG/DL (ref 8.5–10.1)
CHLORIDE SERPL-SCNC: 107 MMOL/L (ref 97–108)
CHOLEST SERPL-MCNC: 330 MG/DL
CO2 SERPL-SCNC: 29 MMOL/L (ref 21–32)
CREAT SERPL-MCNC: 0.81 MG/DL (ref 0.55–1.02)
DIFFERENTIAL METHOD BLD: NORMAL
ENA SS-A AB SER-ACNC: <0.2 AI (ref 0–0.9)
ENA SS-B AB SER-ACNC: <0.2 AI (ref 0–0.9)
EOSINOPHIL # BLD: 0.1 K/UL (ref 0–0.4)
EOSINOPHIL NFR BLD: 2 % (ref 0–7)
ERYTHROCYTE [DISTWIDTH] IN BLOOD BY AUTOMATED COUNT: 12.5 % (ref 11.5–14.5)
EST. AVERAGE GLUCOSE BLD GHB EST-MCNC: 108 MG/DL
GLOBULIN SER CALC-MCNC: 2.9 G/DL (ref 2–4)
GLUCOSE SERPL-MCNC: 101 MG/DL (ref 65–100)
HBA1C MFR BLD: 5.4 % (ref 4–5.6)
HCT VFR BLD AUTO: 44.4 % (ref 35–47)
HDLC SERPL-MCNC: 89 MG/DL
HDLC SERPL: 3.7 (ref 0–5)
HGB BLD-MCNC: 13.9 G/DL (ref 11.5–16)
IMM GRANULOCYTES # BLD AUTO: 0 K/UL (ref 0–0.04)
IMM GRANULOCYTES NFR BLD AUTO: 0 % (ref 0–0.5)
LDLC SERPL CALC-MCNC: 220.8 MG/DL (ref 0–100)
LYMPHOCYTES # BLD: 1.3 K/UL (ref 0.8–3.5)
LYMPHOCYTES NFR BLD: 26 % (ref 12–49)
MCH RBC QN AUTO: 30 PG (ref 26–34)
MCHC RBC AUTO-ENTMCNC: 31.3 G/DL (ref 30–36.5)
MCV RBC AUTO: 95.7 FL (ref 80–99)
MONOCYTES # BLD: 0.5 K/UL (ref 0–1)
MONOCYTES NFR BLD: 10 % (ref 5–13)
NEUTS SEG # BLD: 3.2 K/UL (ref 1.8–8)
NEUTS SEG NFR BLD: 61 % (ref 32–75)
NRBC # BLD: 0 K/UL (ref 0–0.01)
NRBC BLD-RTO: 0 PER 100 WBC
PLATELET # BLD AUTO: 212 K/UL (ref 150–400)
PMV BLD AUTO: 10.5 FL (ref 8.9–12.9)
POTASSIUM SERPL-SCNC: 4.3 MMOL/L (ref 3.5–5.1)
PROT SERPL-MCNC: 6.8 G/DL (ref 6.4–8.2)
RBC # BLD AUTO: 4.64 M/UL (ref 3.8–5.2)
SODIUM SERPL-SCNC: 139 MMOL/L (ref 136–145)
T4 FREE SERPL-MCNC: 0.8 NG/DL (ref 0.8–1.5)
TRIGL SERPL-MCNC: 101 MG/DL
TSH SERPL DL<=0.05 MIU/L-ACNC: 2.47 UIU/ML (ref 0.36–3.74)
VLDLC SERPL CALC-MCNC: 20.2 MG/DL
WBC # BLD AUTO: 5.2 K/UL (ref 3.6–11)

## 2024-06-26 LAB
ENA SS-A AB SER-ACNC: <0.2 AI (ref 0–0.9)
ENA SS-B AB SER-ACNC: <0.2 AI (ref 0–0.9)

## 2025-03-13 ENCOUNTER — TELEPHONE (OUTPATIENT)
Age: 75
End: 2025-03-13

## 2025-03-13 NOTE — TELEPHONE ENCOUNTER
Left VM for pt to mayo appt she has 6/30/25 at 1:30 pm due to Dr. Espinoza out of office-TM 3/13/25.

## 2025-06-17 ENCOUNTER — PATIENT MESSAGE (OUTPATIENT)
Age: 75
End: 2025-06-17

## 2025-06-17 DIAGNOSIS — R42 VERTIGO: Primary | ICD-10-CM

## 2025-07-05 SDOH — ECONOMIC STABILITY: FOOD INSECURITY: WITHIN THE PAST 12 MONTHS, THE FOOD YOU BOUGHT JUST DIDN'T LAST AND YOU DIDN'T HAVE MONEY TO GET MORE.: NEVER TRUE

## 2025-07-05 SDOH — ECONOMIC STABILITY: INCOME INSECURITY: IN THE LAST 12 MONTHS, WAS THERE A TIME WHEN YOU WERE NOT ABLE TO PAY THE MORTGAGE OR RENT ON TIME?: NO

## 2025-07-05 SDOH — ECONOMIC STABILITY: TRANSPORTATION INSECURITY
IN THE PAST 12 MONTHS, HAS THE LACK OF TRANSPORTATION KEPT YOU FROM MEDICAL APPOINTMENTS OR FROM GETTING MEDICATIONS?: NO

## 2025-07-05 SDOH — HEALTH STABILITY: PHYSICAL HEALTH: ON AVERAGE, HOW MANY MINUTES DO YOU ENGAGE IN EXERCISE AT THIS LEVEL?: 60 MIN

## 2025-07-05 SDOH — ECONOMIC STABILITY: FOOD INSECURITY: WITHIN THE PAST 12 MONTHS, YOU WORRIED THAT YOUR FOOD WOULD RUN OUT BEFORE YOU GOT MONEY TO BUY MORE.: NEVER TRUE

## 2025-07-05 SDOH — ECONOMIC STABILITY: TRANSPORTATION INSECURITY
IN THE PAST 12 MONTHS, HAS LACK OF TRANSPORTATION KEPT YOU FROM MEETINGS, WORK, OR FROM GETTING THINGS NEEDED FOR DAILY LIVING?: NO

## 2025-07-05 ASSESSMENT — PATIENT HEALTH QUESTIONNAIRE - PHQ9
SUM OF ALL RESPONSES TO PHQ QUESTIONS 1-9: 0
1. LITTLE INTEREST OR PLEASURE IN DOING THINGS: NOT AT ALL
SUM OF ALL RESPONSES TO PHQ QUESTIONS 1-9: 0
2. FEELING DOWN, DEPRESSED OR HOPELESS: NOT AT ALL
SUM OF ALL RESPONSES TO PHQ QUESTIONS 1-9: 0
SUM OF ALL RESPONSES TO PHQ QUESTIONS 1-9: 0

## 2025-07-05 ASSESSMENT — LIFESTYLE VARIABLES
HOW OFTEN DO YOU HAVE A DRINK CONTAINING ALCOHOL: 2-4 TIMES A MONTH
HOW MANY STANDARD DRINKS CONTAINING ALCOHOL DO YOU HAVE ON A TYPICAL DAY: 1 OR 2
HOW OFTEN DO YOU HAVE SIX OR MORE DRINKS ON ONE OCCASION: 1
HOW OFTEN DO YOU HAVE A DRINK CONTAINING ALCOHOL: 3
HOW MANY STANDARD DRINKS CONTAINING ALCOHOL DO YOU HAVE ON A TYPICAL DAY: 1

## 2025-07-08 ENCOUNTER — OFFICE VISIT (OUTPATIENT)
Age: 75
End: 2025-07-08
Payer: MEDICARE

## 2025-07-08 ENCOUNTER — RESULTS FOLLOW-UP (OUTPATIENT)
Age: 75
End: 2025-07-08

## 2025-07-08 VITALS
BODY MASS INDEX: 23.66 KG/M2 | DIASTOLIC BLOOD PRESSURE: 76 MMHG | WEIGHT: 142 LBS | HEIGHT: 65 IN | TEMPERATURE: 97.5 F | HEART RATE: 75 BPM | SYSTOLIC BLOOD PRESSURE: 120 MMHG | OXYGEN SATURATION: 98 % | RESPIRATION RATE: 15 BRPM

## 2025-07-08 DIAGNOSIS — R53.81 MALAISE AND FATIGUE: ICD-10-CM

## 2025-07-08 DIAGNOSIS — R73.09 ELEVATED GLUCOSE: ICD-10-CM

## 2025-07-08 DIAGNOSIS — M54.2 CERVICALGIA: Primary | ICD-10-CM

## 2025-07-08 DIAGNOSIS — K21.9 GASTROESOPHAGEAL REFLUX DISEASE WITHOUT ESOPHAGITIS: ICD-10-CM

## 2025-07-08 DIAGNOSIS — R51.9 PRESSURE IN HEAD: ICD-10-CM

## 2025-07-08 DIAGNOSIS — R05.3 CHRONIC COUGH: ICD-10-CM

## 2025-07-08 DIAGNOSIS — R41.3 MEMORY CHANGE: ICD-10-CM

## 2025-07-08 DIAGNOSIS — L57.0 AK (ACTINIC KERATOSIS): ICD-10-CM

## 2025-07-08 DIAGNOSIS — E78.2 MIXED HYPERLIPIDEMIA: Primary | ICD-10-CM

## 2025-07-08 DIAGNOSIS — R53.83 MALAISE AND FATIGUE: ICD-10-CM

## 2025-07-08 DIAGNOSIS — R42 VERTIGO: ICD-10-CM

## 2025-07-08 DIAGNOSIS — Z00.00 ENCOUNTER FOR SUBSEQUENT ANNUAL WELLNESS VISIT (AWV) IN MEDICARE PATIENT: ICD-10-CM

## 2025-07-08 DIAGNOSIS — E55.9 VITAMIN D DEFICIENCY, UNSPECIFIED: ICD-10-CM

## 2025-07-08 PROCEDURE — G8399 PT W/DXA RESULTS DOCUMENT: HCPCS | Performed by: FAMILY MEDICINE

## 2025-07-08 PROCEDURE — G2211 COMPLEX E/M VISIT ADD ON: HCPCS | Performed by: FAMILY MEDICINE

## 2025-07-08 PROCEDURE — 96138 PSYCL/NRPSYC TECH 1ST: CPT | Performed by: FAMILY MEDICINE

## 2025-07-08 PROCEDURE — 99214 OFFICE O/P EST MOD 30 MIN: CPT | Performed by: FAMILY MEDICINE

## 2025-07-08 PROCEDURE — G0439 PPPS, SUBSEQ VISIT: HCPCS | Performed by: FAMILY MEDICINE

## 2025-07-08 PROCEDURE — 1159F MED LIST DOCD IN RCRD: CPT | Performed by: FAMILY MEDICINE

## 2025-07-08 PROCEDURE — 3017F COLORECTAL CA SCREEN DOC REV: CPT | Performed by: FAMILY MEDICINE

## 2025-07-08 PROCEDURE — G8427 DOCREV CUR MEDS BY ELIG CLIN: HCPCS | Performed by: FAMILY MEDICINE

## 2025-07-08 PROCEDURE — 1123F ACP DISCUSS/DSCN MKR DOCD: CPT | Performed by: FAMILY MEDICINE

## 2025-07-08 PROCEDURE — 1036F TOBACCO NON-USER: CPT | Performed by: FAMILY MEDICINE

## 2025-07-08 PROCEDURE — 96132 NRPSYC TST EVAL PHYS/QHP 1ST: CPT | Performed by: FAMILY MEDICINE

## 2025-07-08 PROCEDURE — 1126F AMNT PAIN NOTED NONE PRSNT: CPT | Performed by: FAMILY MEDICINE

## 2025-07-08 PROCEDURE — 1160F RVW MEDS BY RX/DR IN RCRD: CPT | Performed by: FAMILY MEDICINE

## 2025-07-08 PROCEDURE — 1090F PRES/ABSN URINE INCON ASSESS: CPT | Performed by: FAMILY MEDICINE

## 2025-07-08 PROCEDURE — G8420 CALC BMI NORM PARAMETERS: HCPCS | Performed by: FAMILY MEDICINE

## 2025-07-08 RX ORDER — OMEPRAZOLE 20 MG/1
20 CAPSULE, DELAYED RELEASE ORAL
Qty: 30 CAPSULE | Refills: 1 | Status: SHIPPED | OUTPATIENT
Start: 2025-07-08

## 2025-07-08 NOTE — PROGRESS NOTES
Chief Complaint   Patient presents with    Medicare AWV         \"Have you been to the ER, urgent care clinic since your last visit?  Hospitalized since your last visit?\"    NO    “Have you seen or consulted any other health care providers outside of [facility] since your last visit?”    NO            Click Here for Release of Records Request           7/5/2025    10:28 AM   PHQ-9    Little interest or pleasure in doing things 0   Feeling down, depressed, or hopeless 0   PHQ-2 Score 0    PHQ-9 Total Score 0        Patient-reported           Financial Resource Strain: Low Risk  (4/17/2024)    Overall Financial Resource Strain (CARDIA)     Difficulty of Paying Living Expenses: Not hard at all      Food Insecurity: No Food Insecurity (7/5/2025)    Hunger Vital Sign     Worried About Running Out of Food in the Last Year: Never true     Ran Out of Food in the Last Year: Never true          Health Maintenance Due   Topic Date Due    COVID-19 Vaccine (1 - 2024-25 season) Never done    Respiratory Syncytial Virus (RSV) Pregnant or age 60 yrs+ (1 - 1-dose 75+ series) Never done    Annual Wellness Visit (Medicare)  06/25/2025       
Medicare Annual Wellness Visit    Susan Lomeli is here for Medicare AWV    Assessment & Plan   Mixed hyperlipidemia  -     Comprehensive Metabolic Panel; Future  -     Lipid Panel; Future  Vertigo  Vitamin D deficiency, unspecified  -     Vitamin D 25 Hydroxy; Future  Malaise and fatigue  -     CBC with Auto Differential; Future  -     TSH; Future  -     T4, Free; Future  -     Urinalysis with Reflex to Culture; Future  Elevated glucose  -     Hemoglobin A1C; Future  Encounter for subsequent annual wellness visit (AWV) in Medicare patient  -     BrainCheck Assess  Memory change       Return in about 1 year (around 7/8/2026) for follow up.     Subjective       Patient's complete Health Risk Assessment and screening values have been reviewed and are found in Flowsheets. The following problems were reviewed today and where indicated follow up appointments were made and/or referrals ordered.    Positive Risk Factor Screenings with Interventions:                    Safety:  Do you have non-slip mats or non-slip surfaces or shower bars or grab bars in your shower or bathtub?: (!) (Patient-Rptd) No    Interventions:  Patient declined any further interventions or treatment                   Objective   Vitals:    07/08/25 0956   BP: 120/76   BP Site: Left Upper Arm   Patient Position: Sitting   BP Cuff Size: Large Adult   Pulse: 75   Resp: 15   Temp: 97.5 °F (36.4 °C)   TempSrc: Temporal   SpO2: 98%   Weight: 64.4 kg (142 lb)   Height: 1.651 m (5' 5\")      Body mass index is 23.63 kg/m².                  No Known Allergies  Prior to Visit Medications    Medication Sig Taking? Authorizing Provider   azelastine (ASTELIN) 0.1 % nasal spray 1 spray by Nasal route at bedtime Use in each nostril as directed as needed Yes Provider, Johnathan, MD   CALCIUM PO Take by mouth daily Yes Automatic Reconciliation, Ar   vitamin D 25 MCG (1000 UT) CAPS Take by mouth daily Yes Automatic Reconciliation, Ar       CareTeam (Including outside 
Medicare Annual Wellness Visit    Susan Lomeli is here for No chief complaint on file.    Assessment & Plan   Mixed hyperlipidemia  -     Comprehensive Metabolic Panel; Future  -     Lipid Panel; Future  Vertigo  Vitamin D deficiency, unspecified  -     Vitamin D 25 Hydroxy; Future  Malaise and fatigue  -     CBC with Auto Differential; Future  -     TSH; Future  -     T4, Free; Future  -     Urinalysis with Reflex to Culture; Future  Elevated glucose  -     Hemoglobin A1C; Future  Encounter for subsequent annual wellness visit (AWV) in Medicare patient       Return in about 1 year (around 7/8/2026) for follow up.     Subjective       Patient's complete Health Risk Assessment and screening values have been reviewed and are found in Flowsheets. The following problems were reviewed today and where indicated follow up appointments were made and/or referrals ordered.    No Positive Risk Factors identified today.                                    Objective   There were no vitals filed for this visit.   There is no height or weight on file to calculate BMI.                  No Known Allergies  Prior to Visit Medications    Medication Sig Taking? Authorizing Provider   azelastine (ASTELIN) 0.1 % nasal spray 1 spray by Nasal route at bedtime Use in each nostril as directed as needed  Provider, MD Johnathan   CALCIUM PO Take by mouth daily  Automatic Reconciliation, Ar   vitamin D 25 MCG (1000 UT) CAPS Take by mouth daily  Automatic Reconciliation, Ar       CareTeam (Including outside providers/suppliers regularly involved in providing care):   Patient Care Team:  Cuco Espinoza MD as PCP - General  Cuco Espinoza MD as PCP - Empaneled Provider     Recommendations for Preventive Services Due: see orders and patient instructions/AVS.  Recommended screening schedule for the next 5-10 years is provided to the patient in written form: see Patient Instructions/AVS.     Reviewed and updated this visit:       Sexual 
Anesth Problems Neg Hx     Diabetes Neg Hx      Social History     Tobacco Use    Smoking status: Former     Current packs/day: 0.00     Types: Cigarettes     Quit date: 1988     Years since quittin.5    Smokeless tobacco: Never   Substance Use Topics    Alcohol use: Yes     Alcohol/week: 1.0 standard drink of alcohol     Types: 1 Glasses of wine per week     Comment: I glass per week        Review of Systems   Constitutional:  Negative for activity change, appetite change and fatigue.   Eyes:  Negative for visual disturbance.   Respiratory:  Negative for shortness of breath.    Cardiovascular:  Negative for chest pain.   Gastrointestinal:  Negative for abdominal pain.   Genitourinary:  Negative for difficulty urinating and dysuria.   Musculoskeletal:  Negative for arthralgias.   Neurological:  Negative for dizziness and headaches.   Psychiatric/Behavioral:  Negative for dysphoric mood. The patient is not nervous/anxious.          Physical Exam  Constitutional:       Appearance: Normal appearance.   HENT:      Head: Normocephalic and atraumatic.      Nose: Nose normal.   Eyes:      Extraocular Movements: Extraocular movements intact.      Pupils: Pupils are equal, round, and reactive to light.   Cardiovascular:      Rate and Rhythm: Normal rate and regular rhythm.      Pulses: Normal pulses.      Heart sounds: Normal heart sounds.   Pulmonary:      Effort: Pulmonary effort is normal.      Breath sounds: Normal breath sounds.   Musculoskeletal:      Cervical back: Neck supple.   Neurological:      General: No focal deficit present.      Mental Status: She is alert and oriented to person, place, and time. Mental status is at baseline.   Psychiatric:         Mood and Affect: Mood normal.         Behavior: Behavior normal.         Thought Content: Thought content normal.         Judgment: Judgment normal.           ASSESSMENT and PLAN  1. Mixed hyperlipidemia  -     Comprehensive Metabolic Panel; Future  -

## 2025-07-08 NOTE — PROCEDURES
Neurocognitive Test Administration    Patient Information:  MISTY LA  1950  Female  This 74 year old female was administered a battery of neurocognitive testing on 07/08/2025.    Reason for Testing:  Encounter for subsequent annual wellness visit (AWV) in Medicare patient    Test Administration Time:  The time spent administering cognitive testing was 18 minutes including setting the patient up, creating the order, discussing cognitive testing, answering questions, administering the test / active testing time, ensuring best practices (i.e. no distractions), and uploading the results. This cognitive testing was administered by a technician.    Neurocognitive Test Interpretation    Reason for Testing:  Encounter for subsequent annual wellness visit (AWV) in Medicare patient    Tests Administered:  Trails A, Trails B, Stroop, Digit Symbol Substitution, Immediate Recognition, Delayed Recognition    Test Results:  Cognitive testing was provided via a battery of cognitive assessments. The pattern of test scores indicate that results are valid.    A Clinical Report with further description of scores and results is also available.    Overall: Patient tested in the 16th percentile (scaled standard score of 85).    Trails A: Patient tested in the 17th percentile (scaled standard score of 86).  Trails B: Patient tested in the 29th percentile (scaled standard score of 92).  Stroop: Patient tested in the 37th percentile (scaled standard score of 95).  Digit Symbol Substitution: Patient tested in the 47th percentile (scaled standard score of 99).  Immediate Recognition: Patient tested in the 9th percentile (scaled standard score of 80).  Delayed Recognition: Patient tested in the 71st percentile (scaled standard score of 108).    Interpretation of Test Scores:  Examination of individual component tests shows:  Attention - Trails A: Unlikely Impairment  Mental Flexibility - Trails B: Unlikely Impairment  Executive

## 2025-07-09 LAB
25(OH)D3 SERPL-MCNC: 46.6 NG/ML (ref 30–100)
ALBUMIN SERPL-MCNC: 4.2 G/DL (ref 3.5–5)
ALBUMIN/GLOB SERPL: 1.4 (ref 1.1–2.2)
ALP SERPL-CCNC: 91 U/L (ref 45–117)
ALT SERPL-CCNC: 22 U/L (ref 12–78)
ANION GAP SERPL CALC-SCNC: 5 MMOL/L (ref 2–12)
APPEARANCE UR: CLEAR
AST SERPL-CCNC: 18 U/L (ref 15–37)
BACTERIA URNS QL MICRO: NEGATIVE /HPF
BASOPHILS # BLD: 0.06 K/UL (ref 0–0.1)
BASOPHILS NFR BLD: 0.9 % (ref 0–1)
BILIRUB SERPL-MCNC: 0.6 MG/DL (ref 0.2–1)
BILIRUB UR QL: NEGATIVE
BUN SERPL-MCNC: 18 MG/DL (ref 6–20)
BUN/CREAT SERPL: 23 (ref 12–20)
CALCIUM SERPL-MCNC: 9.9 MG/DL (ref 8.5–10.1)
CHLORIDE SERPL-SCNC: 107 MMOL/L (ref 97–108)
CHOLEST SERPL-MCNC: 338 MG/DL
CO2 SERPL-SCNC: 27 MMOL/L (ref 21–32)
COLOR UR: ABNORMAL
CREAT SERPL-MCNC: 0.78 MG/DL (ref 0.55–1.02)
DIFFERENTIAL METHOD BLD: NORMAL
EOSINOPHIL # BLD: 0.05 K/UL (ref 0–0.4)
EOSINOPHIL NFR BLD: 0.8 % (ref 0–7)
EPITH CASTS URNS QL MICRO: ABNORMAL /LPF
ERYTHROCYTE [DISTWIDTH] IN BLOOD BY AUTOMATED COUNT: 12.4 % (ref 11.5–14.5)
EST. AVERAGE GLUCOSE BLD GHB EST-MCNC: 114 MG/DL
GLOBULIN SER CALC-MCNC: 3.1 G/DL (ref 2–4)
GLUCOSE SERPL-MCNC: 87 MG/DL (ref 65–100)
GLUCOSE UR STRIP.AUTO-MCNC: NEGATIVE MG/DL
HBA1C MFR BLD: 5.6 % (ref 4–5.6)
HCT VFR BLD AUTO: 45.3 % (ref 35–47)
HDLC SERPL-MCNC: 88 MG/DL
HDLC SERPL: 3.8 (ref 0–5)
HGB BLD-MCNC: 14.8 G/DL (ref 11.5–16)
HGB UR QL STRIP: NEGATIVE
HYALINE CASTS URNS QL MICRO: ABNORMAL /LPF (ref 0–5)
IMM GRANULOCYTES # BLD AUTO: 0.01 K/UL (ref 0–0.04)
IMM GRANULOCYTES NFR BLD AUTO: 0.2 % (ref 0–0.5)
KETONES UR QL STRIP.AUTO: NEGATIVE MG/DL
LDLC SERPL CALC-MCNC: 230 MG/DL (ref 0–100)
LEUKOCYTE ESTERASE UR QL STRIP.AUTO: ABNORMAL
LYMPHOCYTES # BLD: 1.29 K/UL (ref 0.8–3.5)
LYMPHOCYTES NFR BLD: 20.2 % (ref 12–49)
MCH RBC QN AUTO: 30.6 PG (ref 26–34)
MCHC RBC AUTO-ENTMCNC: 32.7 G/DL (ref 30–36.5)
MCV RBC AUTO: 93.8 FL (ref 80–99)
MONOCYTES # BLD: 0.68 K/UL (ref 0–1)
MONOCYTES NFR BLD: 10.7 % (ref 5–13)
NEUTS SEG # BLD: 4.29 K/UL (ref 1.8–8)
NEUTS SEG NFR BLD: 67.2 % (ref 32–75)
NITRITE UR QL STRIP.AUTO: NEGATIVE
NRBC # BLD: 0 K/UL (ref 0–0.01)
NRBC BLD-RTO: 0 PER 100 WBC
PH UR STRIP: 7 (ref 5–8)
PLATELET # BLD AUTO: 228 K/UL (ref 150–400)
PMV BLD AUTO: 10.5 FL (ref 8.9–12.9)
POTASSIUM SERPL-SCNC: 4.7 MMOL/L (ref 3.5–5.1)
PROT SERPL-MCNC: 7.3 G/DL (ref 6.4–8.2)
PROT UR STRIP-MCNC: NEGATIVE MG/DL
RBC # BLD AUTO: 4.83 M/UL (ref 3.8–5.2)
RBC #/AREA URNS HPF: ABNORMAL /HPF (ref 0–5)
SODIUM SERPL-SCNC: 139 MMOL/L (ref 136–145)
SP GR UR REFRACTOMETRY: 1.01 (ref 1–1.03)
T4 FREE SERPL-MCNC: 0.9 NG/DL (ref 0.8–1.5)
TRIGL SERPL-MCNC: 100 MG/DL
TSH SERPL DL<=0.05 MIU/L-ACNC: 2.49 UIU/ML (ref 0.36–3.74)
URINE CULTURE IF INDICATED: ABNORMAL
UROBILINOGEN UR QL STRIP.AUTO: 0.2 EU/DL (ref 0.2–1)
VLDLC SERPL CALC-MCNC: 20 MG/DL
WBC # BLD AUTO: 6.4 K/UL (ref 3.6–11)
WBC URNS QL MICRO: ABNORMAL /HPF (ref 0–4)

## 2025-08-05 ENCOUNTER — TELEPHONE (OUTPATIENT)
Age: 75
End: 2025-08-05

## (undated) DEVICE — BLADE 1882040 5PK INFERIOR TURB 2MM

## (undated) DEVICE — INTENDED FOR TISSUE SEPARATION, AND OTHER PROCEDURES THAT REQUIRE A SHARP SURGICAL BLADE TO PUNCTURE OR CUT.: Brand: BARD-PARKER ® CARBON RIB-BACK BLADES

## (undated) DEVICE — SUTURE ABSORBABLE MONOFILAMENT 4-0 SC-1 18 IN PLN GUT 1824H

## (undated) DEVICE — PAD,EYE,1-5/8X2 5/8,STERILE,LF,1/PK: Brand: MEDLINE

## (undated) DEVICE — APPLICATOR COT-TIP 6IN WOOD -- 2/PK STRL

## (undated) DEVICE — NEEDLE HYPO 25GA L1.5IN BLU POLYPR HUB S STL REG BVL STR

## (undated) DEVICE — STRIP,CLOSURE,WOUND,MEDI-STRIP,1/2X4: Brand: MEDLINE

## (undated) DEVICE — TOWEL SURG W17XL27IN STD BLU COT NONFENESTRATED PREWASHED

## (undated) DEVICE — SOLUTION IV 1000ML 0.9% SOD CHL

## (undated) DEVICE — GOWN,AURORA,NON-REINFORCED,2XL: Brand: MEDLINE

## (undated) DEVICE — PACK,EENT,TURBAN DRAPE,PK II: Brand: MEDLINE

## (undated) DEVICE — GARMENT,MEDLINE,DVT,INT,CALF,MED, GEN2: Brand: MEDLINE

## (undated) DEVICE — INFECTION CONTROL KIT SYS

## (undated) DEVICE — TRAP SURG QUAD PARABOLA SLOT DSGN SFTY SCRN TRAPEASE

## (undated) DEVICE — Z DISCONTINUEDSOLUTION PREP 2OZ 10% POVIDONE IOD SCR CAP BTL

## (undated) DEVICE — SPLINT 1524055 DOYLE II AIRWAY SET: Brand: DOYLE II ™

## (undated) DEVICE — SYR 10ML CTRL LR LCK NSAF LF --

## (undated) DEVICE — SUPPLEMENT DIGESTIVE H2O SOL GI-EASE

## (undated) DEVICE — STRAP,POSITIONING,KNEE/BODY,FOAM,4X60": Brand: MEDLINE

## (undated) DEVICE — CODMAN® SURGICAL PATTIES 1" X 3" (2.54CM X 7.62CM): Brand: CODMAN®

## (undated) DEVICE — 1200 GUARD II KIT W/5MM TUBE W/O VAC TUBE: Brand: GUARDIAN

## (undated) DEVICE — SUTURE CHROMIC GUT SZ 3-0 L27IN ABSRB BRN L19MM FS-2 3/8 636H

## (undated) DEVICE — SURGICAL PROCEDURE PACK BASIN MAJ SET CUST NO CAUT

## (undated) DEVICE — SNARE ENDOSCP DIA9MM SHTH DIA2.4MM CLD FOR POLYP EXACTO

## (undated) DEVICE — ELECTRO LUBE IS A SINGLE PATIENT USE DEVICE THAT IS INTENDED TO BE USED ON ELECTROSURGICAL ELECTRODES TO REDUCE STICKING.: Brand: KEY SURGICAL ELECTRO LUBE

## (undated) DEVICE — SOLUTION IV 250ML 0.9% SOD CHL CLR INJ FLX BG CONT PRT CLSR